# Patient Record
Sex: MALE | Race: WHITE | NOT HISPANIC OR LATINO | Employment: OTHER | URBAN - METROPOLITAN AREA
[De-identification: names, ages, dates, MRNs, and addresses within clinical notes are randomized per-mention and may not be internally consistent; named-entity substitution may affect disease eponyms.]

---

## 2017-02-27 RX ORDER — SIMVASTATIN 40 MG/1
TABLET, FILM COATED ORAL
Qty: 90 TABLET | Refills: 4 | Status: SHIPPED | OUTPATIENT
Start: 2017-02-27 | End: 2018-05-22 | Stop reason: SDUPTHER

## 2017-03-14 ENCOUNTER — LAB VISIT (OUTPATIENT)
Dept: LAB | Facility: HOSPITAL | Age: 72
End: 2017-03-14
Attending: UROLOGY
Payer: MEDICARE

## 2017-03-14 DIAGNOSIS — R97.20 ELEVATED PROSTATE SPECIFIC ANTIGEN (PSA): Primary | ICD-10-CM

## 2017-03-14 LAB — COMPLEXED PSA SERPL-MCNC: 0.48 NG/ML

## 2017-03-14 PROCEDURE — 36415 COLL VENOUS BLD VENIPUNCTURE: CPT | Mod: PO

## 2017-03-14 PROCEDURE — 84153 ASSAY OF PSA TOTAL: CPT

## 2017-03-20 DIAGNOSIS — F41.1 GENERALIZED ANXIETY DISORDER: ICD-10-CM

## 2017-03-20 RX ORDER — ALPRAZOLAM 1 MG/1
TABLET ORAL
Qty: 120 TABLET | Refills: 3 | Status: SHIPPED | OUTPATIENT
Start: 2017-03-20 | End: 2017-10-22 | Stop reason: SDUPTHER

## 2017-05-14 ENCOUNTER — HOSPITAL ENCOUNTER (OUTPATIENT)
Dept: RADIOLOGY | Facility: HOSPITAL | Age: 72
Discharge: HOME | End: 2017-05-14
Attending: NURSE PRACTITIONER
Payer: MEDICARE

## 2017-05-14 ENCOUNTER — OFFICE VISIT (OUTPATIENT)
Dept: URGENT CARE | Facility: CLINIC | Age: 72
End: 2017-05-14
Payer: MEDICARE

## 2017-05-14 VITALS
SYSTOLIC BLOOD PRESSURE: 138 MMHG | WEIGHT: 159.38 LBS | RESPIRATION RATE: 14 BRPM | DIASTOLIC BLOOD PRESSURE: 64 MMHG | BODY MASS INDEX: 22.82 KG/M2 | HEART RATE: 100 BPM | HEIGHT: 70 IN | OXYGEN SATURATION: 95 % | TEMPERATURE: 97 F

## 2017-05-14 DIAGNOSIS — K56.41 FECAL IMPACTION: Primary | ICD-10-CM

## 2017-05-14 DIAGNOSIS — K56.41 FECAL IMPACTION: ICD-10-CM

## 2017-05-14 DIAGNOSIS — K59.00 CONSTIPATION, UNSPECIFIED CONSTIPATION TYPE: ICD-10-CM

## 2017-05-14 PROCEDURE — 1125F AMNT PAIN NOTED PAIN PRSNT: CPT | Mod: S$GLB,,, | Performed by: NURSE PRACTITIONER

## 2017-05-14 PROCEDURE — 1157F ADVNC CARE PLAN IN RCRD: CPT | Mod: S$GLB,,, | Performed by: NURSE PRACTITIONER

## 2017-05-14 PROCEDURE — 1160F RVW MEDS BY RX/DR IN RCRD: CPT | Mod: S$GLB,,, | Performed by: NURSE PRACTITIONER

## 2017-05-14 PROCEDURE — 99999 PR PBB SHADOW E&M-EST. PATIENT-LVL V: CPT | Mod: PBBFAC,,, | Performed by: NURSE PRACTITIONER

## 2017-05-14 PROCEDURE — 3078F DIAST BP <80 MM HG: CPT | Mod: S$GLB,,, | Performed by: NURSE PRACTITIONER

## 2017-05-14 PROCEDURE — 74020 XR ABDOMEN FLAT AND ERECT: CPT | Mod: TC,PO

## 2017-05-14 PROCEDURE — 99499 UNLISTED E&M SERVICE: CPT | Mod: S$GLB,,, | Performed by: NURSE PRACTITIONER

## 2017-05-14 PROCEDURE — 74020 XR ABDOMEN FLAT AND ERECT: CPT | Mod: 26,,, | Performed by: RADIOLOGY

## 2017-05-14 PROCEDURE — 3075F SYST BP GE 130 - 139MM HG: CPT | Mod: S$GLB,,, | Performed by: NURSE PRACTITIONER

## 2017-05-14 PROCEDURE — 99213 OFFICE O/P EST LOW 20 MIN: CPT | Mod: S$GLB,,, | Performed by: NURSE PRACTITIONER

## 2017-05-14 PROCEDURE — 1159F MED LIST DOCD IN RCRD: CPT | Mod: S$GLB,,, | Performed by: NURSE PRACTITIONER

## 2017-05-14 NOTE — MR AVS SNAPSHOT
[facility]  [id number] [facility]  [place] [address]  Phone: [phone]  Fax: [phone]                  Tico Reeves   2017 9:30 AM   Office Visit    Description:  Male : 1945   Provider:  KATHRYN Leon   Department:  [facility]           Reason for Visit     Constipation           Diagnoses this Visit        Comments    Fecal impaction    -  Primary            To Do List           Goals (5 Years of Data)     None       These Medications        Disp Refills Start End    bisacodyl (FLEET) 10 mg/30 mL Enem 1 enema 0 2017    Place 30 mLs (10 mg total) rectally once. - Rectal    [facility] - [place] [address]  [address] [facility] #: [phone]         [facility] On [facility]     [facility] On Call Nurse Care Line -  Assistance  Unless otherwise directed by your provider, please contact [facility], our nurse care line that is available for  assistance.     Registered nurses in the [facility] provide: appointment scheduling, clinical advisement, health education, and other advisory services.  Call: [phone] (toll free)               Medications           Message regarding Medications     Verify the changes and/or additions to your medication regime listed below are the same as discussed with your clinician today.  If any of these changes or additions are incorrect, please notify your healthcare provider.        START taking these NEW medications        Refills    bisacodyl (FLEET) 10 mg/30 mL Enem 0    Sig: Place 30 mLs (10 mg total) rectally once.    Class: Print    Route: Rectal      STOP taking these medications     methylPREDNISolone (MEDROL, ARLENE,) 4 mg tablet use as directed           Verify that the below list of medications is an accurate representation of the medications you are currently taking.  If none reported, the list may be blank. If incorrect, please contact your  "healthcare provider. Carry this list with you in case of emergency.           Current Medications     alprazolam (XANAX) 1 MG tablet TAKE ONE TABLET BY MOUTH IN THE MORNING, 1 TABLET AT NOON, AND TWO TABLETS AT BEDTIME    aspirin 81 mg Tab Take 81 mg by mouth Daily. 1 Tablet Oral Every day    cetirizine (ZYRTEC) 10 MG tablet Take 10 mg by mouth once daily.    citalopram (CELEXA) 40 MG tablet TAKE ONE TABLET BY MOUTH ONCE DAILY    docusate sodium (COLACE) 100 MG capsule Take 1 capsule (100 mg total) by mouth 2 (two) times daily.    melatonin 10 mg Tab Take 1 tablet by mouth every evening. Pt takes 10mg and 5mg sometimes     midodrine (PROAMATINE) 10 MG tablet Take 1 tablet (10 mg total) by mouth 3 (three) times daily.    nitroGLYCERIN (NITROSTAT) 0.4 MG SL tablet Place 1 tablet (0.4 mg total) under the tongue every 5 (five) minutes as needed for Chest pain. 1 Tablet, Sublingual Sublingual As directed.  Take 1 tab sublingual prn chest pain.  If pain not gone in 5 minutes, please repeat.    omeprazole (PRILOSEC) 20 MG capsule TAKE ONE CAPSULE BY MOUTH IN THE MORNING    quetiapine (SEROQUEL) 25 MG Tab Take 2 tablets (50 mg total) by mouth every evening.    rivastigmine tartrate (EXELON) 3 MG capsule TAKE ONE CAPSULE BY MOUTH TWICE DAILY    simvastatin (ZOCOR) 40 MG tablet TAKE ONE TABLET BY MOUTH IN THE EVENING    baclofen (LIORESAL) 10 MG tablet Take 1 tablet (10 mg total) by mouth 3 (three) times daily.    bisacodyl (FLEET) 10 mg/30 mL Enem Place 30 mLs (10 mg total) rectally once.    tamsulosin (FLOMAX) 0.4 mg Cp24 Take 1 capsule (0.4 mg total) by mouth once daily.           Clinical Reference Information           Your Vitals Were     BP Pulse Temp Resp Height Weight    138/64 (BP Location: Left arm, Patient Position: Sitting) 100 97.2 °F (36.2 °C) 14 5' 10" (1.778 m) 72.3 kg (159 lb 6.3 oz)    SpO2 BMI             95% 22.87 kg/m2         Blood Pressure          Most Recent Value    BP  138/64      Allergies as of " 5/14/2017     Lipitor [Atorvastatin]      Immunizations Administered on Date of Encounter - 5/14/2017     None      Orders Placed During Today's Visit     Future Labs/Procedures Expected by Expires    X-Ray Abdomen Flat And Erect  5/14/2017 5/14/2018      Instructions        Treated Fecal Impaction  Fecal impaction is a severe form of constipation. It means you have a large amount of hard stool in your rectum that you can't pass. Although your impaction has been relieved, you may need to continue treatment at home. Your healthcare provider will tell you what to do. Follow the advice below to help avoid this problem in the future.  Fecal impaction can have many causes. Many of them are similar to the causes of constipation. They include:  · Diet low in fiber  · Too many dairy products and too much processed food  · Not drinking enough liquids  · Lack of exercise or physical activity  · Stress, depression  · Changes in daily routines  · Loss of body fluids because of vomiting or diarrhea  · Ignoring the urge to have a bowel movement or delaying until later  · Medicines like prescription pain medicines, especially narcotics, iron supplements, antacids, certain antidepressants, and calcium supplements  · An underlying illness  Home care  Take any medicines as directed. It is no longer thought that laxatives can cause damage to the intestines. However, some are better choices for occasional and long-term use. Talk with your healthcare provider or pharmacist if you have questions.  General care  · Prescription pain medicines can cause constipation. If your healthcare provider prescribes pain medicines, ask whether you should also take a stool softener.  · A diet high in fiber with plenty of fluids helps to maintain regular, soft bowel movements. These foods are good sources of dietary fiber:  ¨ Cereals and breads, such as whole-grain cereal with bran, oatmeal, rolled oats, and whole-grain breads  ¨ Fruits, both fresh and  dried, including raisins, prunes, apricots, berries, and figs  ¨ Fresh vegetables, especially peas, broccoli, brussels sprouts, winter squash, green beans, cauliflower, lima beans, and carrots  ¨ Popcorn and brown rice  · Drink plenty of water when you increase the amount of fiber you eat.  Follow-up care  Follow up with your healthcare provider if your symptoms don't get better in the next few days, or as advised. You may need more tests or to see a specialist.  Call 911  Call 911 if any of these occur:  · Stiff, rigid abdomen that is severely painful to touch, or when you move  · Trouble breathing  · Chest pain  · Confusion  · Fainting or loss of consciousness  · Rapid heart rate  When to seek medical advice  Call your healthcare provider right away if any of these occur:  · Fever over 100.4°F (38.0°C)  · You still don't have normal bowel movements  · Abdominal or back pain gets worse  · Vomiting  · Abdominal swelling  · Blood in the stool, or black, tarry school  · Weakness, dizziness, or fainting  · Unexpected vaginal bleeding  · Weight loss  Date Last Reviewed: 12/30/2015  © 2269-2738 [employer]. [address] [place] [address], [place] [address]. All rights reserved. This information is not intended as a substitute for professional medical care. Always follow your healthcare professional's instructions.        Treated Fecal Impaction  Fecal impaction is a severe form of constipation. It means you have a large amount of hard stool in your rectum that you can't pass. Although your impaction has been relieved, you may need to continue treatment at home. Your healthcare provider will tell you what to do. Follow the advice below to help avoid this problem in the future.  Fecal impaction can have many causes. Many of them are similar to the causes of constipation. They include:  · Diet low in fiber  · Too many dairy products and too much processed food  · Not drinking enough liquids  · Lack of exercise or  physical activity  · Stress, depression  · Changes in daily routines  · Loss of body fluids because of vomiting or diarrhea  · Ignoring the urge to have a bowel movement or delaying until later  · Medicines like prescription pain medicines, especially narcotics, iron supplements, antacids, certain antidepressants, and calcium supplements  · An underlying illness  Home care  Take any medicines as directed. It is no longer thought that laxatives can cause damage to the intestines. However, some are better choices for occasional and long-term use. Talk with your healthcare provider or pharmacist if you have questions.  General care  · Prescription pain medicines can cause constipation. If your healthcare provider prescribes pain medicines, ask whether you should also take a stool softener.  · A diet high in fiber with plenty of fluids helps to maintain regular, soft bowel movements. These foods are good sources of dietary fiber:  ¨ Cereals and breads, such as whole-grain cereal with bran, oatmeal, rolled oats, and whole-grain breads  ¨ Fruits, both fresh and dried, including raisins, prunes, apricots, berries, and figs  ¨ Fresh vegetables, especially peas, broccoli, brussels sprouts, winter squash, green beans, cauliflower, lima beans, and carrots  ¨ Popcorn and brown rice  · Drink plenty of water when you increase the amount of fiber you eat.  Follow-up care  Follow up with your healthcare provider if your symptoms don't get better in the next few days, or as advised. You may need more tests or to see a specialist.  Call 911  Call 911 if any of these occur:  · Stiff, rigid abdomen that is severely painful to touch, or when you move  · Trouble breathing  · Chest pain  · Confusion  · Fainting or loss of consciousness  · Rapid heart rate  When to seek medical advice  Call your healthcare provider right away if any of these occur:  · Fever over 100.4°F (38.0°C)  · You still don't have normal bowel movements  · Abdominal  or back pain gets worse  · Vomiting  · Abdominal swelling  · Blood in the stool, or black, tarry school  · Weakness, dizziness, or fainting  · Unexpected vaginal bleeding  · Weight loss  Date Last Reviewed: 12/30/2015 © 2000-2016 [employer]. [address] [place] [address], [place] [address]. All rights reserved. This information is not intended as a substitute for professional medical care. Always follow your healthcare professional's instructions.             Language Assistance Services     ATTENTION: Language assistance services are available, free of charge. Please call [phone].      ATENCIÓN: Si habla español, tiene a jewell disposición servicios gratuitos de asistencia lingüística. Llame al [phone].     CHÚ Ý: N?u b?n nói Ti?ng Vi?t, có các d?ch v? h? tr? ngôn ng? mi?n phí dành cho b?n. G?i s? [phone].         [facility] complies with applicable Federal civil rights laws and does not discriminate on the basis of race, color, national origin, age, disability, or sex.

## 2017-05-14 NOTE — PROGRESS NOTES
Subjective:       Patient ID: Tico Reeves is a 72 y.o. male.    Chief Complaint: Constipation (x five days)    HPI   Mr. Reeves presents to urgent care this AM with his wife. She explains that he has dementia and is somewhat obsessed with his bowels. He is complaining of rectal and abdominal pain. They are unclear when his last BM occurred. He is not a reliable historian. He does take a stool softener and Violetta lax daily.   Review of Systems   Constitutional: Negative for chills, diaphoresis and fever.   Respiratory: Negative for cough and shortness of breath.    Cardiovascular: Negative for leg swelling.   Gastrointestinal: Positive for abdominal pain and constipation. Negative for nausea and vomiting.   Skin: Negative for rash.   Psychiatric/Behavioral: Negative for behavioral problems and confusion.       Objective:      Physical Exam   Constitutional: He appears well-developed and well-nourished.   Eyes: Conjunctivae are normal.   Neck: Normal range of motion.   Cardiovascular: Normal rate and regular rhythm.    Pulmonary/Chest: Effort normal and breath sounds normal. No respiratory distress.   Abdominal: Soft. Bowel sounds are normal. He exhibits no distension. There is generalized tenderness. There is no rebound, no guarding, no tenderness at McBurney's point and negative Joel's sign.   Rectal exam reveals a hard stool ball which I was able to remove. Afterwards he was able to pass a large quantity of brown stool.   Musculoskeletal: Normal range of motion.   Neurological: He is alert.   Skin: Skin is warm and dry.   Psychiatric: He has a normal mood and affect. His behavior is normal.   Vitals reviewed.      Assessment:       1. Fecal impaction    2. Constipation, unspecified constipation type        Plan:   Tico was seen today for constipation.    Diagnoses and all orders for this visit:    Fecal impaction  -     X-Ray Abdomen Flat And Erect; Future    Constipation, unspecified constipation  type    Other orders  -     bisacodyl (FLEET) 10 mg/30 mL Enem; Place 30 mLs (10 mg total) rectally once.    I still see some fecal material present on x-ray. They will use a fleets enema at home. I will call with x-ray results. May need stimulant laxative.    Keep a record of when he has a bowel movement. If greater than three days use a fleets enema or laxative.    High fiber diet  Continue colace/ Miralax  Drink at least 64 ounces of water daily

## 2017-05-14 NOTE — PATIENT INSTRUCTIONS
Treated Fecal Impaction  Fecal impaction is a severe form of constipation. It means you have a large amount of hard stool in your rectum that you can't pass. Although your impaction has been relieved, you may need to continue treatment at home. Your healthcare provider will tell you what to do. Follow the advice below to help avoid this problem in the future.  Fecal impaction can have many causes. Many of them are similar to the causes of constipation. They include:  · Diet low in fiber  · Too many dairy products and too much processed food  · Not drinking enough liquids  · Lack of exercise or physical activity  · Stress, depression  · Changes in daily routines  · Loss of body fluids because of vomiting or diarrhea  · Ignoring the urge to have a bowel movement or delaying until later  · Medicines like prescription pain medicines, especially narcotics, iron supplements, antacids, certain antidepressants, and calcium supplements  · An underlying illness  Home care  Take any medicines as directed. It is no longer thought that laxatives can cause damage to the intestines. However, some are better choices for occasional and long-term use. Talk with your healthcare provider or pharmacist if you have questions.  General care  · Prescription pain medicines can cause constipation. If your healthcare provider prescribes pain medicines, ask whether you should also take a stool softener.  · A diet high in fiber with plenty of fluids helps to maintain regular, soft bowel movements. These foods are good sources of dietary fiber:  ¨ Cereals and breads, such as whole-grain cereal with bran, oatmeal, rolled oats, and whole-grain breads  ¨ Fruits, both fresh and dried, including raisins, prunes, apricots, berries, and figs  ¨ Fresh vegetables, especially peas, broccoli, brussels sprouts, winter squash, green beans, cauliflower, lima beans, and carrots  ¨ Popcorn and brown rice  · Drink plenty of water when you increase the amount  of fiber you eat.  Follow-up care  Follow up with your healthcare provider if your symptoms don't get better in the next few days, or as advised. You may need more tests or to see a specialist.  Call 911  Call 911 if any of these occur:  · Stiff, rigid abdomen that is severely painful to touch, or when you move  · Trouble breathing  · Chest pain  · Confusion  · Fainting or loss of consciousness  · Rapid heart rate  When to seek medical advice  Call your healthcare provider right away if any of these occur:  · Fever over 100.4°F (38.0°C)  · You still don't have normal bowel movements  · Abdominal or back pain gets worse  · Vomiting  · Abdominal swelling  · Blood in the stool, or black, tarry school  · Weakness, dizziness, or fainting  · Unexpected vaginal bleeding  · Weight loss  Date Last Reviewed: 12/30/2015 © 2000-2016 [employer]. [address] [place] [address], [place] [address]. All rights reserved. This information is not intended as a substitute for professional medical care. Always follow your healthcare professional's instructions.        Treated Fecal Impaction  Fecal impaction is a severe form of constipation. It means you have a large amount of hard stool in your rectum that you can't pass. Although your impaction has been relieved, you may need to continue treatment at home. Your healthcare provider will tell you what to do. Follow the advice below to help avoid this problem in the future.  Fecal impaction can have many causes. Many of them are similar to the causes of constipation. They include:  · Diet low in fiber  · Too many dairy products and too much processed food  · Not drinking enough liquids  · Lack of exercise or physical activity  · Stress, depression  · Changes in daily routines  · Loss of body fluids because of vomiting or diarrhea  · Ignoring the urge to have a bowel movement or delaying until later  · Medicines like prescription pain medicines, especially narcotics, iron  supplements, antacids, certain antidepressants, and calcium supplements  · An underlying illness  Home care  Take any medicines as directed. It is no longer thought that laxatives can cause damage to the intestines. However, some are better choices for occasional and long-term use. Talk with your healthcare provider or pharmacist if you have questions.  General care  · Prescription pain medicines can cause constipation. If your healthcare provider prescribes pain medicines, ask whether you should also take a stool softener.  · A diet high in fiber with plenty of fluids helps to maintain regular, soft bowel movements. These foods are good sources of dietary fiber:  ¨ Cereals and breads, such as whole-grain cereal with bran, oatmeal, rolled oats, and whole-grain breads  ¨ Fruits, both fresh and dried, including raisins, prunes, apricots, berries, and figs  ¨ Fresh vegetables, especially peas, broccoli, brussels sprouts, winter squash, green beans, cauliflower, lima beans, and carrots  ¨ Popcorn and brown rice  · Drink plenty of water when you increase the amount of fiber you eat.  Follow-up care  Follow up with your healthcare provider if your symptoms don't get better in the next few days, or as advised. You may need more tests or to see a specialist.  Call 911  Call 911 if any of these occur:  · Stiff, rigid abdomen that is severely painful to touch, or when you move  · Trouble breathing  · Chest pain  · Confusion  · Fainting or loss of consciousness  · Rapid heart rate  When to seek medical advice  Call your healthcare provider right away if any of these occur:  · Fever over 100.4°F (38.0°C)  · You still don't have normal bowel movements  · Abdominal or back pain gets worse  · Vomiting  · Abdominal swelling  · Blood in the stool, or black, tarry school  · Weakness, dizziness, or fainting  · Unexpected vaginal bleeding  · Weight loss  Date Last Reviewed: 12/30/2015  © 6842-9788 [employer]. [address]  [place] [address]. All rights reserved. This information is not intended as a substitute for professional medical care. Always follow your healthcare professional's instructions.

## 2017-05-19 ENCOUNTER — OFFICE VISIT (OUTPATIENT)
Dept: NEUROLOGY | Facility: CLINIC | Age: 72
End: 2017-05-19
Payer: MEDICARE

## 2017-05-19 VITALS
SYSTOLIC BLOOD PRESSURE: 100 MMHG | HEIGHT: 70 IN | HEART RATE: 74 BPM | BODY MASS INDEX: 23.58 KG/M2 | WEIGHT: 164.69 LBS | DIASTOLIC BLOOD PRESSURE: 72 MMHG

## 2017-05-19 DIAGNOSIS — G31.84 MILD COGNITIVE IMPAIRMENT WITH MEMORY LOSS: ICD-10-CM

## 2017-05-19 DIAGNOSIS — F32.1 MODERATE SINGLE CURRENT EPISODE OF MAJOR DEPRESSIVE DISORDER: Primary | ICD-10-CM

## 2017-05-19 PROCEDURE — 3074F SYST BP LT 130 MM HG: CPT | Mod: S$GLB,,, | Performed by: PSYCHIATRY & NEUROLOGY

## 2017-05-19 PROCEDURE — 1160F RVW MEDS BY RX/DR IN RCRD: CPT | Mod: S$GLB,,, | Performed by: PSYCHIATRY & NEUROLOGY

## 2017-05-19 PROCEDURE — 1126F AMNT PAIN NOTED NONE PRSNT: CPT | Mod: S$GLB,,, | Performed by: PSYCHIATRY & NEUROLOGY

## 2017-05-19 PROCEDURE — 99214 OFFICE O/P EST MOD 30 MIN: CPT | Mod: S$GLB,,, | Performed by: PSYCHIATRY & NEUROLOGY

## 2017-05-19 PROCEDURE — 1159F MED LIST DOCD IN RCRD: CPT | Mod: S$GLB,,, | Performed by: PSYCHIATRY & NEUROLOGY

## 2017-05-19 PROCEDURE — 99499 UNLISTED E&M SERVICE: CPT | Mod: S$GLB,,, | Performed by: PSYCHIATRY & NEUROLOGY

## 2017-05-19 PROCEDURE — 1157F ADVNC CARE PLAN IN RCRD: CPT | Mod: S$GLB,,, | Performed by: PSYCHIATRY & NEUROLOGY

## 2017-05-19 PROCEDURE — 3078F DIAST BP <80 MM HG: CPT | Mod: S$GLB,,, | Performed by: PSYCHIATRY & NEUROLOGY

## 2017-05-19 PROCEDURE — 99999 PR PBB SHADOW E&M-EST. PATIENT-LVL III: CPT | Mod: PBBFAC,,, | Performed by: PSYCHIATRY & NEUROLOGY

## 2017-05-19 RX ORDER — RIVASTIGMINE TARTRATE 6 MG/1
6 CAPSULE ORAL 2 TIMES DAILY
Qty: 60 CAPSULE | Refills: 11 | Status: SHIPPED | OUTPATIENT
Start: 2017-05-19 | End: 2018-05-22 | Stop reason: SDUPTHER

## 2017-05-19 RX ORDER — QUETIAPINE FUMARATE 25 MG/1
TABLET, FILM COATED ORAL
Qty: 270 TABLET | Refills: 3 | Status: SHIPPED | OUTPATIENT
Start: 2017-05-19 | End: 2018-09-04 | Stop reason: SDUPTHER

## 2017-05-19 NOTE — MR AVS SNAPSHOT
[facility]  [address] [place] [id number]  Phone: [phone]  Fax: [phone]                  Tico Reeves   2017 9:00 AM   Office Visit    Description:  Male : 1945   Provider:  Edis Gasca MD   Department:  [facility]           Reason for Visit     Neurologic Problem           Diagnoses this Visit        Comments    Moderate single current episode of major depressive disorder    -  Primary     Mild cognitive impairment with memory loss                To Do List           Goals (5 Years of Data)     None       These Medications        Disp Refills Start End    quetiapine (SEROQUEL) 25 MG Tab 270 tablet 3 2017     Take 1 tab in the AM and 2 in the PM    Pharmacy: [facility] [address] [place] [address] Ph #: [phone]       rivastigmine tartrate (EXELON) 6 mg capsule 60 capsule 11 2017     Take 1 capsule (6 mg total) by mouth 2 (two) times daily. - Oral    Pharmacy: [facility] [address] [place] [address] Ph #: [phone]         [facility]     [facility] Nurse Care Line -  Assistance  Unless otherwise directed by your provider, please contact [facility], our nurse care line that is available for  assistance.     Registered nurses in the [facility] provide: appointment scheduling, clinical advisement, health education, and other advisory services.  Call: [phone] (toll free)               Medications           Message regarding Medications     Verify the changes and/or additions to your medication regime listed below are the same as discussed with your clinician today.  If any of these changes or additions are incorrect, please notify your healthcare provider.        CHANGE how you are taking these medications     Start Taking Instead of    quetiapine (SEROQUEL) 25 MG Tab quetiapine (SEROQUEL) 25 MG Tab    Dosage:  Take 1 tab in the  AM and 2 in the PM Dosage:  Take 2 tablets (50 mg total) by mouth every evening.    Reason for Change:  Reorder     rivastigmine tartrate (EXELON) 6 mg capsule rivastigmine tartrate (EXELON) 3 MG capsule    Dosage:  Take 1 capsule (6 mg total) by mouth 2 (two) times daily. Dosage:  TAKE ONE CAPSULE BY MOUTH TWICE DAILY    Reason for Change:  Reorder            Verify that the below list of medications is an accurate representation of the medications you are currently taking.  If none reported, the list may be blank. If incorrect, please contact your healthcare provider. Carry this list with you in case of emergency.           Current Medications     alprazolam (XANAX) 1 MG tablet TAKE ONE TABLET BY MOUTH IN THE MORNING, 1 TABLET AT NOON, AND TWO TABLETS AT BEDTIME    aspirin 81 mg Tab Take 81 mg by mouth Daily. 1 Tablet Oral Every day    baclofen (LIORESAL) 10 MG tablet Take 1 tablet (10 mg total) by mouth 3 (three) times daily.    cetirizine (ZYRTEC) 10 MG tablet Take 10 mg by mouth once daily.    citalopram (CELEXA) 40 MG tablet TAKE ONE TABLET BY MOUTH ONCE DAILY    docusate sodium (COLACE) 100 MG capsule Take 1 capsule (100 mg total) by mouth 2 (two) times daily.    melatonin 10 mg Tab Take 1 tablet by mouth every evening. Pt takes 10mg and 5mg sometimes     midodrine (PROAMATINE) 10 MG tablet Take 1 tablet (10 mg total) by mouth 3 (three) times daily.    nitroGLYCERIN (NITROSTAT) 0.4 MG SL tablet Place 1 tablet (0.4 mg total) under the tongue every 5 (five) minutes as needed for Chest pain. 1 Tablet, Sublingual Sublingual As directed.  Take 1 tab sublingual prn chest pain.  If pain not gone in 5 minutes, please repeat.    omeprazole (PRILOSEC) 20 MG capsule TAKE ONE CAPSULE BY MOUTH IN THE MORNING    quetiapine (SEROQUEL) 25 MG Tab Take 1 tab in the AM and 2 in the PM    rivastigmine tartrate (EXELON) 6 mg capsule Take 1 capsule (6 mg total) by mouth 2 (two) times daily.    simvastatin (ZOCOR) 40 MG tablet TAKE  "ONE TABLET BY MOUTH IN THE EVENING    tamsulosin (FLOMAX) 0.4 mg Cp24 Take 1 capsule (0.4 mg total) by mouth once daily.           Clinical Reference Information           Your Vitals Were     BP Pulse Height Weight BMI    100/72 74 5' 10" (1.778 m) 74.7 kg (164 lb 10.9 oz) 23.63 kg/m2      Blood Pressure          Most Recent Value    BP  100/72      Allergies as of 5/19/2017     Lipitor [Atorvastatin]      Immunizations Administered on Date of Encounter - 5/19/2017     None      Language Assistance Services     ATTENTION: Language assistance services are available, free of charge. Please call [phone].      ATENCIÓN: Si habla seraañol, tiene a jewell disposición servicios gratuitos de asistencia lingüística. Llame al [phone].     CHÚ Ý: N?u b?n nói Ti?ng Vi?t, có các d?ch v? h? tr? ngôn ng? mi?n phí dành cho b?n. G?i s? [phone].         [facility] complies with applicable Federal civil rights laws and does not discriminate on the basis of race, color, national origin, age, disability, or sex.        "

## 2017-05-19 NOTE — PROGRESS NOTES
This is a 72-year-old right-handed patient who is followed by this office for his mild cognitive impairment.  The patient however is accounted by his wife today and she indicates that his anxiety level has seem to worsen.  In addition, he is now having a tendency to focus almost completely on urination and bowel movements.  The patient has been actively investigated by urology and no significant abnormality has been identified through cystometrogram and other diagnostic studies.  However, the patient continues to complain of burning in the lower abdomen and in the penile area.  The patient states that he has difficulty urinating at times and has to drink a lot of water to be able to void.  The patient's wife indicates that he will then focus on urination throughout the day as well as focus on having a bowel movement.    The patient has periods of extreme anxiety becoming extremely restless almost agitated.  Based upon his blood pressure, the patient's wife will give him a small dose of Xanax which will help calm the severe anxiety momentarily.  However, the Xanax does seem to lower his blood pressure excessively.    The patient is occasionally awakening at night to urinate.  He does not seem to have evidence of sundowning.  He denies any his wife denies any active visual or auditory hallucinations.  He continues to have a resting and intention tremor which is worse in the right than on the left.  He is able to ambulate however independently without difficulty.  He is not had any falls.  He is not had any seizure or unexplained loss of consciousness.      ROS:  GENERAL: No fever, chills, or night sweats.  SKIN: No rashes, itching or changes in color or texture of skin.  HEAD: No headaches or recent head trauma.  EYES: Visual acuity fine. No photophobia, ocular pain or diplopia.  EARS: Denies ear pain, discharge or vertigo.  NOSE: No loss of smell, no epistaxis or postnasal drip.  MOUTH & THROAT: No hoarseness or  change in voice. No excessive gum bleeding.  NODES: Denies swollen glands.  CHEST: Denies BERRIOS, cyanosis, wheezing, cough and sputum production.  CARDIOVASCULAR: Denies chest pain, PND, orthopnea or reduced exercise tolerance.  ABDOMEN: Appetite fine. No weight loss. Denies diarrhea, abdominal pain, hematemesis or blood in stool.  URINARY: See comments in present illness.  PERIPHERAL VASCULAR: No claudication or cyanosis.  MUSCULOSKELETAL: No joint stiffness or swelling. Denies back pain.  NEUROLOGIC: No history of seizures, paralysis, alteration of gait or coordination.    The patient's past history, family history, and social history were reviewed with the patient, his medical records, and his wife.    PE:   VITAL SIGNS: Blood pressure 100/72, pulse 74, weight 74.7 kg, height 5 foot 10 inches, BMI 23.63  APPEARANCE: Well nourished, well developed, extremely anxious and restless  HEAD: Normocephalic, atraumatic.  EYES: PERRL. EOMI.  Non-icteric sclerae.    EARS: TM's intact. Light reflex normal. No retraction or perforation.    NOSE: Mucosa pink. Airway clear.  MOUTH & THROAT: No tonsillar enlargement. No pharyngeal erythema or exudate. No stridor.  NECK: Supple. No bruits.  CHEST: Lungs clear to auscultation.  CARDIOVASCULAR: Regular rhythm without significant murmurs.  ABDOMEN: Bowel sounds normal. Not distended.   MUSCULOSKELETAL:  No bony deformity seen.  Muscle tone is normal.  No cogwheel rigidity is found with passive range of motion at either wrist or either elbow.  Muscle mass is normal both upper and both lower extremities as well.  NEUROLOGIC:   Mental Status:  The patient is well oriented to person, and place.  However he could only name the month but not the day, date, or year.  He is able to follow one-step commands without difficulty.  His speech however is focused on burning in the lower abdomen and penile area.  The patient is attentive to the environment and cooperative for the exam.  Cranial  Nerves: II-XII grossly intact. Fundoscopic exam is normal.  No hemorrhage, exudate or papilledema is present. The extraocular muscles are intact in the cardinal directions of gaze.  No ptosis is present. Facial features are symmetrical.  Speech is normal in fluency, diction, and phrasing.  Tongue protrudes in the midline.    Gait and Station: The patient was able to come from sit to stand independently.  He ambulates independently with a slightly slow wide based gait without shuffling.  Motor:  No downdrift of either arm when held at shoulder level.  Manual muscle testing of proximal and distal muscles of both upper and lower extremities is normal.   Sensory:  Intact both upper and lower extremities to pin prick, touch, and vibration.  Cerebellar: The patient has a fairly active resting tremor in the right hand which is not seen with finger to nose.  Alternating movements are very slowly performed on the right compared to that on the left.  Reflexes:  Stretch reflexes are 1+ both upper and lower extremities.  Plantar stimulation is flexor bilaterally and no pathological reflexes are seen      ASSESSMENT:  1.  Mild cognitive impairment with memory loss in patient with tremor suggestive of Parkinson's    DISCUSSION:  The patient has tried and failed medications for tremor in the past and so these medications have been discontinued.  His primary problem on today's visit is that of his generalized anxiety disorder with a tendency to fixate on urinary habit and bowel habits.  The patient's wife indicates that the urologist have completed their workup and did not find any significant abnormality.  He definitely does not have a urinary tract infection.  However, the patient continues to worry constantly about his ability to urinate.    RECOMMENDATIONS:  1.  Increase Exelon to 6 mg twice a day  2.  Change Seroquel to 25 mg in the morning and 50 mg at bedtime  3.  We offered psychiatric referral for the patient but the wife  refused at this time.  She wishes to try the change in medication first.  4.  Return to neurology in 6 months.    This was a 30 minute visit with the patient and his wife with over 50% of time spent counseling the patient and his wife.    This note is generated with speech recognition software and is subject to transcription error and sound alike phrases that may be missed by proofreading.

## 2017-05-24 ENCOUNTER — PATIENT MESSAGE (OUTPATIENT)
Dept: NEUROLOGY | Facility: CLINIC | Age: 72
End: 2017-05-24

## 2017-06-26 RX ORDER — QUETIAPINE FUMARATE 25 MG/1
TABLET, FILM COATED ORAL
Qty: 180 TABLET | Refills: 3 | Status: SHIPPED | OUTPATIENT
Start: 2017-06-26 | End: 2017-12-11 | Stop reason: SDUPTHER

## 2017-06-26 RX ORDER — OMEPRAZOLE 20 MG/1
CAPSULE, DELAYED RELEASE ORAL
Qty: 90 CAPSULE | Refills: 4 | Status: SHIPPED | OUTPATIENT
Start: 2017-06-26 | End: 2018-08-14 | Stop reason: SDUPTHER

## 2017-07-17 RX ORDER — MIDODRINE HYDROCHLORIDE 10 MG/1
10 TABLET ORAL 2 TIMES DAILY
Qty: 60 TABLET | Refills: 0 | Status: SHIPPED | OUTPATIENT
Start: 2017-07-17 | End: 2017-08-21 | Stop reason: SDUPTHER

## 2017-08-21 RX ORDER — MIDODRINE HYDROCHLORIDE 10 MG/1
10 TABLET ORAL 2 TIMES DAILY
Qty: 60 TABLET | Refills: 0 | Status: SHIPPED | OUTPATIENT
Start: 2017-08-21 | End: 2017-10-20 | Stop reason: SDUPTHER

## 2017-10-20 RX ORDER — MIDODRINE HYDROCHLORIDE 10 MG/1
TABLET ORAL
Qty: 60 TABLET | Refills: 0 | Status: SHIPPED | OUTPATIENT
Start: 2017-10-20 | End: 2017-12-08 | Stop reason: SDUPTHER

## 2017-10-22 DIAGNOSIS — F41.1 GENERALIZED ANXIETY DISORDER: ICD-10-CM

## 2017-10-23 RX ORDER — ALPRAZOLAM 1 MG/1
TABLET ORAL
Qty: 120 TABLET | Refills: 3 | Status: SHIPPED | OUTPATIENT
Start: 2017-10-23 | End: 2018-05-01 | Stop reason: SDUPTHER

## 2017-11-08 ENCOUNTER — TELEPHONE (OUTPATIENT)
Dept: INTERNAL MEDICINE | Facility: CLINIC | Age: 72
End: 2017-11-08

## 2017-11-08 DIAGNOSIS — F32.1 MODERATE SINGLE CURRENT EPISODE OF MAJOR DEPRESSIVE DISORDER: Primary | ICD-10-CM

## 2017-11-08 RX ORDER — BUPROPION HYDROCHLORIDE 150 MG/1
150 TABLET ORAL DAILY
Qty: 30 TABLET | Refills: 11 | Status: SHIPPED | OUTPATIENT
Start: 2017-11-08 | End: 2018-11-01 | Stop reason: SDUPTHER

## 2017-11-08 NOTE — TELEPHONE ENCOUNTER
He is maximized on celexa.  Will add wellbutrin xl 150 mg once daily.  Referral placed to psychiatry for follow up.

## 2017-11-08 NOTE — TELEPHONE ENCOUNTER
Wife stated that patient has hit a road in depression where he is continuously crying often.  Wife stated he is on the celexa 40mg and a xanax as back up but can't ever take the xanax due to his blood pressure being too low when it comes time for him to need one.  Wife wants to know what can we change or increase to help with patients depression.  Please advise.

## 2017-12-08 RX ORDER — MIDODRINE HYDROCHLORIDE 10 MG/1
TABLET ORAL
Qty: 60 TABLET | Refills: 0 | Status: SHIPPED | OUTPATIENT
Start: 2017-12-08 | End: 2018-02-01 | Stop reason: SDUPTHER

## 2017-12-11 ENCOUNTER — OFFICE VISIT (OUTPATIENT)
Dept: INTERNAL MEDICINE | Facility: CLINIC | Age: 72
End: 2017-12-11
Payer: MEDICARE

## 2017-12-11 ENCOUNTER — LAB VISIT (OUTPATIENT)
Dept: LAB | Facility: HOSPITAL | Age: 72
End: 2017-12-11
Attending: INTERNAL MEDICINE
Payer: MEDICARE

## 2017-12-11 VITALS
WEIGHT: 151.69 LBS | SYSTOLIC BLOOD PRESSURE: 112 MMHG | DIASTOLIC BLOOD PRESSURE: 70 MMHG | HEIGHT: 70 IN | HEART RATE: 74 BPM | BODY MASS INDEX: 21.72 KG/M2 | TEMPERATURE: 98 F

## 2017-12-11 DIAGNOSIS — F41.9 ANXIETY: ICD-10-CM

## 2017-12-11 DIAGNOSIS — E78.2 MIXED HYPERLIPIDEMIA: ICD-10-CM

## 2017-12-11 DIAGNOSIS — I10 ESSENTIAL HYPERTENSION: ICD-10-CM

## 2017-12-11 DIAGNOSIS — Z12.5 ENCOUNTER FOR SCREENING FOR MALIGNANT NEOPLASM OF PROSTATE: ICD-10-CM

## 2017-12-11 DIAGNOSIS — I25.10 CAD, MULTIPLE VESSEL: ICD-10-CM

## 2017-12-11 DIAGNOSIS — F32.1 MODERATE SINGLE CURRENT EPISODE OF MAJOR DEPRESSIVE DISORDER: ICD-10-CM

## 2017-12-11 DIAGNOSIS — Z12.11 COLON CANCER SCREENING: ICD-10-CM

## 2017-12-11 DIAGNOSIS — K59.01 SLOW TRANSIT CONSTIPATION: ICD-10-CM

## 2017-12-11 DIAGNOSIS — Z00.00 ROUTINE GENERAL MEDICAL EXAMINATION AT HEALTH CARE FACILITY: Primary | ICD-10-CM

## 2017-12-11 DIAGNOSIS — I70.0 ATHEROSCLEROSIS OF AORTA: ICD-10-CM

## 2017-12-11 LAB
ALBUMIN SERPL BCP-MCNC: 3.8 G/DL
ALP SERPL-CCNC: 96 U/L
ALT SERPL W/O P-5'-P-CCNC: 9 U/L
ANION GAP SERPL CALC-SCNC: 7 MMOL/L
AST SERPL-CCNC: 18 U/L
BASOPHILS # BLD AUTO: 0.06 K/UL
BASOPHILS NFR BLD: 1 %
BILIRUB SERPL-MCNC: 0.8 MG/DL
BUN SERPL-MCNC: 16 MG/DL
CALCIUM SERPL-MCNC: 9.9 MG/DL
CHLORIDE SERPL-SCNC: 106 MMOL/L
CHOLEST SERPL-MCNC: 156 MG/DL
CHOLEST/HDLC SERPL: 3.5 {RATIO}
CO2 SERPL-SCNC: 29 MMOL/L
COMPLEXED PSA SERPL-MCNC: 0.75 NG/ML
CREAT SERPL-MCNC: 1 MG/DL
DIFFERENTIAL METHOD: ABNORMAL
EOSINOPHIL # BLD AUTO: 0.2 K/UL
EOSINOPHIL NFR BLD: 2.4 %
ERYTHROCYTE [DISTWIDTH] IN BLOOD BY AUTOMATED COUNT: 12.2 %
EST. GFR  (AFRICAN AMERICAN): >60 ML/MIN/1.73 M^2
EST. GFR  (NON AFRICAN AMERICAN): >60 ML/MIN/1.73 M^2
GLUCOSE SERPL-MCNC: 112 MG/DL
HCT VFR BLD AUTO: 43.6 %
HDLC SERPL-MCNC: 45 MG/DL
HDLC SERPL: 28.8 %
HGB BLD-MCNC: 14.8 G/DL
IMM GRANULOCYTES # BLD AUTO: 0.01 K/UL
IMM GRANULOCYTES NFR BLD AUTO: 0.2 %
LDLC SERPL CALC-MCNC: 93.4 MG/DL
LYMPHOCYTES # BLD AUTO: 1.7 K/UL
LYMPHOCYTES NFR BLD: 27.7 %
MCH RBC QN AUTO: 32.5 PG
MCHC RBC AUTO-ENTMCNC: 33.9 G/DL
MCV RBC AUTO: 96 FL
MONOCYTES # BLD AUTO: 0.5 K/UL
MONOCYTES NFR BLD: 7.9 %
NEUTROPHILS # BLD AUTO: 3.8 K/UL
NEUTROPHILS NFR BLD: 60.8 %
NONHDLC SERPL-MCNC: 111 MG/DL
NRBC BLD-RTO: 0 /100 WBC
PLATELET # BLD AUTO: 233 K/UL
PMV BLD AUTO: 10 FL
POTASSIUM SERPL-SCNC: 5.4 MMOL/L
PROT SERPL-MCNC: 7.3 G/DL
RBC # BLD AUTO: 4.55 M/UL
SODIUM SERPL-SCNC: 142 MMOL/L
TRIGL SERPL-MCNC: 88 MG/DL
WBC # BLD AUTO: 6.17 K/UL

## 2017-12-11 PROCEDURE — 36415 COLL VENOUS BLD VENIPUNCTURE: CPT | Mod: PO

## 2017-12-11 PROCEDURE — 99999 PR PBB SHADOW E&M-EST. PATIENT-LVL III: CPT | Mod: PBBFAC,,, | Performed by: INTERNAL MEDICINE

## 2017-12-11 PROCEDURE — 99397 PER PM REEVAL EST PAT 65+ YR: CPT | Mod: 25,S$GLB,, | Performed by: INTERNAL MEDICINE

## 2017-12-11 PROCEDURE — 80053 COMPREHEN METABOLIC PANEL: CPT

## 2017-12-11 PROCEDURE — 99499 UNLISTED E&M SERVICE: CPT | Mod: S$GLB,,, | Performed by: INTERNAL MEDICINE

## 2017-12-11 PROCEDURE — 90662 IIV NO PRSV INCREASED AG IM: CPT | Mod: S$GLB,,, | Performed by: INTERNAL MEDICINE

## 2017-12-11 PROCEDURE — 80061 LIPID PANEL: CPT

## 2017-12-11 PROCEDURE — 84153 ASSAY OF PSA TOTAL: CPT

## 2017-12-11 PROCEDURE — 85025 COMPLETE CBC W/AUTO DIFF WBC: CPT

## 2017-12-11 PROCEDURE — G0008 ADMIN INFLUENZA VIRUS VAC: HCPCS | Mod: S$GLB,,, | Performed by: INTERNAL MEDICINE

## 2017-12-11 RX ORDER — POLYETHYLENE GLYCOL 3350 17 G/17G
17 POWDER, FOR SOLUTION ORAL DAILY
Qty: 1530 G | Refills: 11 | Status: SHIPPED | OUTPATIENT
Start: 2017-12-11

## 2017-12-11 NOTE — PROGRESS NOTES
Subjective:       Patient ID: Tico Reeves is a 72 y.o. male.    Chief Complaint: Annual Exam    HPI  Patient is a sitting 2-year-old male presented for updated physical exam review chronic health issues.  Is patient has history of depression and anxiety, CAD, hyperlipidemia, hypertension, atherosclerotic disease and some issues with constipation.  Overall he indicates he's been doing pretty well.  Recently stable on his current regimens at this time.  He continues to struggle with not having a lot of motivation to do things with his anxiety and depression.  He is suffering now with some issues around constipation.  He's lost some weight recently because he is not eating because of constipation.  Several of his medications certainly are contributing to his constipation issue and as a result it he is sort of cut back on his by mouth intake.  We discussed his regimen right now he takes a stool softener daily and uses a laxative about twice a week.  Even with this it doesn't seem to be giving him the bowel movements that he would like.  He is relatively sedentary.  He does not drink a lot of water.    Review of Systems   Constitutional: Negative for fever and unexpected weight change.   HENT: Negative for hearing loss, postnasal drip and rhinorrhea.    Eyes: Negative for pain and visual disturbance.   Respiratory: Negative for cough, shortness of breath and wheezing.    Cardiovascular: Negative for chest pain and palpitations.   Gastrointestinal: Positive for constipation. Negative for diarrhea, nausea and vomiting.   Genitourinary: Negative for dysuria and hematuria.   Musculoskeletal: Negative for arthralgias, back pain, myalgias and neck stiffness.   Skin: Negative for pallor and rash.   Neurological: Negative for seizures, syncope and headaches.   Hematological: Negative for adenopathy.   Psychiatric/Behavioral: Negative for dysphoric mood. The patient is nervous/anxious.        Objective:   /70   Pulse  "74   Temp 97.6 °F (36.4 °C)   Ht 5' 10" (1.778 m)   Wt 68.8 kg (151 lb 10.8 oz)   BMI 21.76 kg/m²      Physical Exam   Constitutional: He is oriented to person, place, and time. He appears well-developed and well-nourished. No distress.   HENT:   Head: Normocephalic and atraumatic.   Mouth/Throat: Oropharynx is clear and moist.   Eyes: EOM are normal. Pupils are equal, round, and reactive to light.   Neck: Normal range of motion. Neck supple. No JVD present. No thyromegaly present.   Cardiovascular: Normal rate, regular rhythm, normal heart sounds and intact distal pulses.  Exam reveals no gallop and no friction rub.    No murmur heard.  Pulmonary/Chest: Effort normal and breath sounds normal. He has no wheezes. He has no rales.   Abdominal: Soft. Bowel sounds are normal. He exhibits no distension. There is no tenderness. There is no rebound and no guarding.   Musculoskeletal: Normal range of motion. He exhibits no edema.   Lymphadenopathy:     He has no cervical adenopathy.   Neurological: He is alert and oriented to person, place, and time. He has normal reflexes. No cranial nerve deficit.   Skin: Skin is warm and dry. No rash noted.   Psychiatric: He has a normal mood and affect. Judgment normal.   Vitals reviewed.          Assessment:       1. Routine general medical examination at health care facility    2. Moderate single current episode of major depressive disorder    3. Anxiety    4. CAD, multiple vessel    5. Mixed hyperlipidemia    6. Essential hypertension    7. Atherosclerosis of aorta    8. Colon cancer screening    9. Slow transit constipation    10. Encounter for screening for malignant neoplasm of prostate        Plan:   Major depressive disorder, single episode  Continue meds, stable over time.    Hypertension  Blood pressure is under good control.  We will continue the current regimen.  Will work on regular aerobic exercise and a low salt diet.        Hyperlipidemia  Update labs.  Continue " simvastatin    CAD, multiple vessel  No chest pain.  Stable over time.  Following with Cardiology    Anxiety  Continue current meds.  No changes today    Tico was seen today for annual exam.    Diagnoses and all orders for this visit:    Routine general medical examination at health care facility  Comments:  Focus on good health habits, low fat diet, regular exercise, seatbelt use.    Moderate single current episode of major depressive disorder    Anxiety    CAD, multiple vessel  -     Lipid panel; Future  -     Comprehensive metabolic panel; Future  -     CBC auto differential; Future    Mixed hyperlipidemia    Essential hypertension    Atherosclerosis of aorta    Colon cancer screening  -     Case request GI: COLONOSCOPY    Slow transit constipation    Encounter for screening for malignant neoplasm of prostate  -     PSA, Screening; Future    Other orders  -     polyethylene glycol (GLYCOLAX) 17 gram/dose powder; Take 17 g by mouth once daily.  -     Influenza - High Dose (65+) (PF) (IM)     we discussed some options and will coordinate his go ahead and start him back on MiraLAX every morning.  Indicated they can increase this to twice a day if they need to.  I stressed the importance of drinking water.  He needs to try to get at least 60 ounces of water a day.  I also expressed that he needs to get up and move about.  I talked to them about the importance of moving to help the bowels functioned properly and expressed understanding.  We will update his lab work.  He is due for colonoscopy so that is been ordered.  If they do not see significant improvement in his bowels with this new regimen for follow-up.    Return in about 6 months (around 6/11/2018).

## 2017-12-12 ENCOUNTER — PATIENT MESSAGE (OUTPATIENT)
Dept: INTERNAL MEDICINE | Facility: CLINIC | Age: 72
End: 2017-12-12

## 2017-12-12 ENCOUNTER — LAB VISIT (OUTPATIENT)
Dept: LAB | Facility: HOSPITAL | Age: 72
End: 2017-12-12
Attending: INTERNAL MEDICINE
Payer: MEDICARE

## 2017-12-12 DIAGNOSIS — E87.5 HYPERKALEMIA: ICD-10-CM

## 2017-12-12 DIAGNOSIS — R73.9 HYPERGLYCEMIA: ICD-10-CM

## 2017-12-12 DIAGNOSIS — E87.5 HYPERKALEMIA: Primary | ICD-10-CM

## 2017-12-12 LAB
ESTIMATED AVG GLUCOSE: 108 MG/DL
HBA1C MFR BLD HPLC: 5.4 %
POTASSIUM SERPL-SCNC: 4.5 MMOL/L

## 2017-12-12 PROCEDURE — 84132 ASSAY OF SERUM POTASSIUM: CPT

## 2017-12-12 PROCEDURE — 36415 COLL VENOUS BLD VENIPUNCTURE: CPT | Mod: PO

## 2017-12-12 PROCEDURE — 83036 HEMOGLOBIN GLYCOSYLATED A1C: CPT

## 2017-12-28 RX ORDER — NITROGLYCERIN 0.4 MG/1
0.4 TABLET SUBLINGUAL EVERY 5 MIN PRN
Qty: 30 TABLET | Refills: 11 | Status: SHIPPED | OUTPATIENT
Start: 2017-12-28 | End: 2020-03-23 | Stop reason: SDUPTHER

## 2018-01-08 ENCOUNTER — PES CALL (OUTPATIENT)
Dept: ADMINISTRATIVE | Facility: CLINIC | Age: 73
End: 2018-01-08

## 2018-01-15 ENCOUNTER — LAB VISIT (OUTPATIENT)
Dept: LAB | Facility: HOSPITAL | Age: 73
End: 2018-01-15
Attending: INTERNAL MEDICINE
Payer: MEDICARE

## 2018-01-15 ENCOUNTER — TELEPHONE (OUTPATIENT)
Dept: INTERNAL MEDICINE | Facility: CLINIC | Age: 73
End: 2018-01-15

## 2018-01-15 DIAGNOSIS — R30.0 DYSURIA: ICD-10-CM

## 2018-01-15 DIAGNOSIS — R30.0 DYSURIA: Primary | ICD-10-CM

## 2018-01-15 LAB

## 2018-01-15 PROCEDURE — 81003 URINALYSIS AUTO W/O SCOPE: CPT

## 2018-01-15 NOTE — TELEPHONE ENCOUNTER
Wife dropped off urine on patient, patient is fixated on a possible UTI. Burning.  Can you please sign orders for a urinalysis.

## 2018-02-01 RX ORDER — MIDODRINE HYDROCHLORIDE 10 MG/1
TABLET ORAL
Qty: 60 TABLET | Refills: 6 | Status: SHIPPED | OUTPATIENT
Start: 2018-02-01 | End: 2018-06-18 | Stop reason: SDUPTHER

## 2018-02-21 RX ORDER — CITALOPRAM 40 MG/1
TABLET, FILM COATED ORAL
Qty: 90 TABLET | Refills: 4 | Status: SHIPPED | OUTPATIENT
Start: 2018-02-21 | End: 2018-11-19 | Stop reason: SDUPTHER

## 2018-03-07 ENCOUNTER — PES CALL (OUTPATIENT)
Dept: ADMINISTRATIVE | Facility: CLINIC | Age: 73
End: 2018-03-07

## 2018-05-01 DIAGNOSIS — F41.1 GENERALIZED ANXIETY DISORDER: ICD-10-CM

## 2018-05-01 RX ORDER — ALPRAZOLAM 1 MG/1
TABLET ORAL
Qty: 120 TABLET | Refills: 3 | Status: SHIPPED | OUTPATIENT
Start: 2018-05-01 | End: 2018-11-19 | Stop reason: SDUPTHER

## 2018-05-15 ENCOUNTER — TELEPHONE (OUTPATIENT)
Dept: GASTROENTEROLOGY | Facility: CLINIC | Age: 73
End: 2018-05-15

## 2018-05-15 ENCOUNTER — HOSPITAL ENCOUNTER (OUTPATIENT)
Dept: RADIOLOGY | Facility: HOSPITAL | Age: 73
Discharge: HOME | End: 2018-05-15
Attending: NURSE PRACTITIONER
Payer: MEDICARE

## 2018-05-15 ENCOUNTER — OFFICE VISIT (OUTPATIENT)
Dept: GASTROENTEROLOGY | Facility: CLINIC | Age: 73
End: 2018-05-15
Payer: MEDICARE

## 2018-05-15 VITALS
BODY MASS INDEX: 19.76 KG/M2 | HEIGHT: 70 IN | HEART RATE: 74 BPM | DIASTOLIC BLOOD PRESSURE: 86 MMHG | SYSTOLIC BLOOD PRESSURE: 132 MMHG | WEIGHT: 138 LBS

## 2018-05-15 DIAGNOSIS — K59.04 CHRONIC IDIOPATHIC CONSTIPATION: ICD-10-CM

## 2018-05-15 DIAGNOSIS — R10.84 GENERALIZED ABDOMINAL PAIN: ICD-10-CM

## 2018-05-15 DIAGNOSIS — K59.04 CHRONIC IDIOPATHIC CONSTIPATION: Primary | ICD-10-CM

## 2018-05-15 DIAGNOSIS — R63.4 WEIGHT LOSS: ICD-10-CM

## 2018-05-15 PROCEDURE — 74019 RADEX ABDOMEN 2 VIEWS: CPT | Mod: TC,FY,PO

## 2018-05-15 PROCEDURE — 99204 OFFICE O/P NEW MOD 45 MIN: CPT | Mod: S$GLB,,, | Performed by: NURSE PRACTITIONER

## 2018-05-15 PROCEDURE — 3075F SYST BP GE 130 - 139MM HG: CPT | Mod: CPTII,S$GLB,, | Performed by: NURSE PRACTITIONER

## 2018-05-15 PROCEDURE — 3079F DIAST BP 80-89 MM HG: CPT | Mod: CPTII,S$GLB,, | Performed by: NURSE PRACTITIONER

## 2018-05-15 PROCEDURE — 74019 RADEX ABDOMEN 2 VIEWS: CPT | Mod: 26,,, | Performed by: RADIOLOGY

## 2018-05-15 PROCEDURE — 99999 PR PBB SHADOW E&M-EST. PATIENT-LVL III: CPT | Mod: PBBFAC,,, | Performed by: NURSE PRACTITIONER

## 2018-05-15 RX ORDER — ACETAMINOPHEN 500 MG
1 TABLET ORAL DAILY
COMMUNITY

## 2018-05-15 RX ORDER — BISACODYL 5 MG
5 TABLET, DELAYED RELEASE (ENTERIC COATED) ORAL DAILY PRN
COMMUNITY

## 2018-05-15 RX ORDER — ESOMEPRAZOLE MAGNESIUM 40 MG/1
40 CAPSULE, DELAYED RELEASE ORAL
COMMUNITY
End: 2018-08-22 | Stop reason: ALTCHOICE

## 2018-05-15 RX ORDER — LUBIPROSTONE 8 UG/1
8 CAPSULE ORAL 2 TIMES DAILY WITH MEALS
Qty: 60 CAPSULE | Refills: 2 | Status: SHIPPED | OUTPATIENT
Start: 2018-05-15 | End: 2018-05-21

## 2018-05-15 NOTE — PROGRESS NOTES
Clinic Consult:  [facility] Consultation Note    Reason for Consult:  The primary encounter diagnosis was Chronic idiopathic constipation. Diagnoses of Generalized abdominal pain and Weight loss were also pertinent to this visit.    PCP: Gonzales Shelton   No address on file    HPI:  This is a 73 y.o. male here for evaluation of the above. He is accompanied by his wife. He presents to clinic with chronic constipation that has begun to worsen. He does not have spontaneous bowel moments without help from enemas. He has about 2-3 BM per week. Associated symptoms include abdominal pain. He has also had recent weight loss. Colonoscopy is not up to date. Was due in November 2017. No hematochezia, melena, nausea, and vomiting.     Review of Systems   Constitutional: Negative for fever, malaise/fatigue and weight loss.   HENT: Negative for sore throat.    Respiratory: Negative for cough and wheezing.    Cardiovascular: Negative for chest pain and palpitations.   Gastrointestinal: Positive for abdominal pain and constipation. Negative for blood in stool, diarrhea, heartburn, melena, nausea and vomiting.   Genitourinary: Negative for dysuria and frequency.   Musculoskeletal: Negative for back pain, joint pain, myalgias and neck pain.   Skin: Negative for itching and rash.   Neurological: Negative for dizziness, speech change, seizures, loss of consciousness and headaches.   Psychiatric/Behavioral: Negative for depression and substance abuse. The patient is not nervous/anxious.        Medical History:  has a past medical history of Abnormal ECG (10/27/2015); Anxiety; CAD, multiple vessel (10/27/2015); Carotid artery occlusion; Carotid artery stenosis (6/4/2013); Coronary artery disease; Dementia; History of PTCA (10/27/2015); Hyperlipidemia; Hypertension; Myocardial infarction; Prostate cancer; Sleep apnea; Stroke; and Subdural hematoma, post-traumatic.    Surgical History:  has a past surgical history that  includes Craniotomy; Ext carotid to Int carotid bypass; Prostate surgery (2001); Coronary artery bypass graft (2001); Hemorrhoid surgery; Brain surgery; and Cardiac surgery.    Family History: family history includes Cancer in his father; Heart disease in his brother and mother..     Social History:  reports that he quit smoking about 22 years ago. His smoking use included Cigarettes. He has a 40.00 pack-year smoking history. He has never used smokeless tobacco. He reports that he does not drink alcohol or use drugs.    Allergies: Reviewed    Home Medications:   Current Outpatient Prescriptions on File Prior to Visit   Medication Sig Dispense Refill    ALPRAZolam (XANAX) 1 MG tablet TAKE ONE TABLET BY MOUTH IN THE MORNING THEN ON TABLET AT NOON THEN TWO TABLETS AT BEDTIME 120 tablet 3    buPROPion (WELLBUTRIN XL) 150 MG TB24 tablet Take 1 tablet (150 mg total) by mouth once daily. 30 tablet 11    cetirizine (ZYRTEC) 10 MG tablet Take 10 mg by mouth once daily.      citalopram (CELEXA) 40 MG tablet TAKE ONE TABLET BY MOUTH ONCE DAILY 90 tablet 4    docusate sodium (COLACE) 100 MG capsule Take 1 capsule (100 mg total) by mouth 2 (two) times daily. 60 capsule 0    melatonin 10 mg Tab Take 1 tablet by mouth every evening. Pt takes 10mg and 5mg sometimes       midodrine (PROAMATINE) 10 MG tablet TAKE ONE TABLET BY MOUTH TWICE DAILY 60 tablet 6    nitroGLYCERIN (NITROSTAT) 0.4 MG SL tablet Place 1 tablet (0.4 mg total) under the tongue every 5 (five) minutes as needed for Chest pain. 1 Tablet, Sublingual Sublingual As directed. 30 tablet 11    polyethylene glycol (GLYCOLAX) 17 gram/dose powder Take 17 g by mouth once daily. 1530 g 11    quetiapine (SEROQUEL) 25 MG Tab Take 1 tab in the AM and 2 in the PM (Patient taking differently: Take 1 tab in the AM and 1 in the PM) 270 tablet 3    rivastigmine tartrate (EXELON) 6 mg capsule Take 1 capsule (6 mg total) by mouth 2 (two) times daily. 60 capsule 11     "simvastatin (ZOCOR) 40 MG tablet TAKE ONE TABLET BY MOUTH IN THE EVENING 90 tablet 4    aspirin 81 mg Tab Take 81 mg by mouth Daily. 1 Tablet Oral Every day      omeprazole (PRILOSEC) 20 MG capsule TAKE ONE CAPSULE BY MOUTH ONCE DAILY IN THE MORNING 90 capsule 4    tamsulosin (FLOMAX) 0.4 mg Cp24 Take 1 capsule (0.4 mg total) by mouth once daily. (Patient taking differently: Take 0.4 mg by mouth as needed. ) 90 capsule 4     No current facility-administered medications on file prior to visit.        Physical Exam:  /86   Pulse 74   Ht 5' 10" (1.778 m)   Wt 62.6 kg (138 lb 0.1 oz)   BMI 19.80 kg/m²   Body mass index is 19.8 kg/m².  Physical Exam   Constitutional: He is oriented to person, place, and time and well-developed, well-nourished, and in no distress. No distress.   HENT:   Head: Normocephalic.   Eyes: Conjunctivae are normal. Pupils are equal, round, and reactive to light.   Cardiovascular: Normal rate, regular rhythm and normal heart sounds.    Pulmonary/Chest: Effort normal and breath sounds normal. No respiratory distress.   Abdominal: Soft. Bowel sounds are normal. He exhibits no distension. There is no tenderness.   Neurological: He is alert and oriented to person, place, and time. No cranial nerve deficit.   Skin: Skin is warm and dry. No rash noted.   Psychiatric: Mood and affect normal.       Labs: Pertinent labs reviewed.  CRC Screening: due    Assessment:  1. Chronic idiopathic constipation    2. Generalized abdominal pain    3. Weight loss         Recommendations:  Chronic idiopathic constipation  Generalized abdominal pain  - failure with OTC preparations including Colace, Dulcolax, and Miralax  - Will start Amitiza. Can increase dose if needed.   - recommend clean out with Miralax prep prior to starting Amitiza.   -     lubiprostone (AMITIZA) 8 MCG Cap; Take 1 capsule (8 mcg total) by mouth 2 (two) times daily with meals.  Dispense: 60 capsule; Refill: 2  -     X-Ray Abdomen Flat " And Erect; Future; Expected date: 05/15/2018    Weight loss  - need repeat colonoscopy  - patient not ready to schedule      Follow-up in about 4 weeks (around 6/12/2018).    Thank you so much for allowing me to participate in the care of Tico Hadley, KATHRYN-C

## 2018-05-16 ENCOUNTER — TELEPHONE (OUTPATIENT)
Dept: GASTROENTEROLOGY | Facility: CLINIC | Age: 73
End: 2018-05-16

## 2018-05-16 NOTE — TELEPHONE ENCOUNTER
----- Message from Hali Pacheco sent at 5/16/2018  8:34 AM CDT -----  Pt wife(Matilda) at [phone]//states she has questions regarding pt's Miralax prep//if they started it this morning can he do the whole thing today////please call asa//myke/domingo

## 2018-05-16 NOTE — TELEPHONE ENCOUNTER
Returned patients wife call and explained to her a little more about the Mirilax prep and what times to start. Patients wife  verbalized understanding.

## 2018-05-18 ENCOUNTER — TELEPHONE (OUTPATIENT)
Dept: GASTROENTEROLOGY | Facility: CLINIC | Age: 73
End: 2018-05-18

## 2018-05-18 NOTE — TELEPHONE ENCOUNTER
Returned call to pt's wife, Matilda and informed her, per Fatoumata Hadley, it is ok to use Linzess 72mcg daily instead of Amitiza.

## 2018-05-18 NOTE — TELEPHONE ENCOUNTER
----- Message from Frank Reddy sent at 5/18/2018 11:44 AM CDT -----  Contact: pt wife  Caller is requesting a back back from nurse in regards to mirilax prep did good instead of Rx  gave pt  I have a lot of samples of the linzess 72mcg cap can he take this instead. If so  How many time a day can he have this and can he have it everyday.               [phone] (home)

## 2018-05-21 ENCOUNTER — TELEPHONE (OUTPATIENT)
Dept: GASTROENTEROLOGY | Facility: CLINIC | Age: 73
End: 2018-05-21

## 2018-05-21 DIAGNOSIS — K59.04 CHRONIC IDIOPATHIC CONSTIPATION: Primary | ICD-10-CM

## 2018-05-21 NOTE — TELEPHONE ENCOUNTER
----- Message from Saritha Musa sent at 5/21/2018  1:46 PM CDT -----  Contact: pt spouse-Matilda jones was recently prescribed Linzess 72 mcg. State the patient have been taking this medication for three days and have not had a bowel movement. Please adv/call [phone].//thanks.[email or web address]

## 2018-05-21 NOTE — TELEPHONE ENCOUNTER
Informed pt's wife, per Fatoumata Hadley, to increase Linzess dose to 145mcg daily. She verbalized understanding and will let us know if this does not work.

## 2018-05-21 NOTE — TELEPHONE ENCOUNTER
Returned call to pt's wife who stated that pt has been eating well and taking Linzess 72mcg x 3 days but has not seen any results yet. She would like to know if dosage should be increased. Please advise.

## 2018-05-22 DIAGNOSIS — G31.84 MILD COGNITIVE IMPAIRMENT WITH MEMORY LOSS: ICD-10-CM

## 2018-05-22 RX ORDER — SIMVASTATIN 40 MG/1
TABLET, FILM COATED ORAL
Qty: 90 TABLET | Refills: 4 | Status: SHIPPED | OUTPATIENT
Start: 2018-05-22 | End: 2019-09-26 | Stop reason: SDUPTHER

## 2018-05-22 RX ORDER — RIVASTIGMINE TARTRATE 6 MG/1
CAPSULE ORAL
Qty: 60 CAPSULE | Refills: 11 | Status: SHIPPED | OUTPATIENT
Start: 2018-05-22 | End: 2019-04-09 | Stop reason: DRUGHIGH

## 2018-06-18 RX ORDER — MIDODRINE HYDROCHLORIDE 10 MG/1
10 TABLET ORAL 2 TIMES DAILY
Qty: 180 TABLET | Refills: 3 | Status: SHIPPED | OUTPATIENT
Start: 2018-06-18 | End: 2018-06-26 | Stop reason: SDUPTHER

## 2018-06-26 RX ORDER — MIDODRINE HYDROCHLORIDE 10 MG/1
10 TABLET ORAL 2 TIMES DAILY
Qty: 180 TABLET | Refills: 3 | Status: SHIPPED | OUTPATIENT
Start: 2018-06-26 | End: 2018-08-22 | Stop reason: SDUPTHER

## 2018-08-14 RX ORDER — OMEPRAZOLE 20 MG/1
20 CAPSULE, DELAYED RELEASE ORAL DAILY
Qty: 90 CAPSULE | Refills: 4 | Status: SHIPPED | OUTPATIENT
Start: 2018-08-14 | End: 2018-08-22 | Stop reason: SDUPTHER

## 2018-08-22 RX ORDER — MIDODRINE HYDROCHLORIDE 10 MG/1
TABLET ORAL
Qty: 60 TABLET | Refills: 6 | Status: SHIPPED | OUTPATIENT
Start: 2018-08-22 | End: 2019-07-02 | Stop reason: SDUPTHER

## 2018-08-22 RX ORDER — OMEPRAZOLE 20 MG/1
20 CAPSULE, DELAYED RELEASE ORAL DAILY
Qty: 90 CAPSULE | Refills: 4 | Status: SHIPPED | OUTPATIENT
Start: 2018-08-22 | End: 2019-09-26 | Stop reason: SDUPTHER

## 2018-08-28 ENCOUNTER — TELEPHONE (OUTPATIENT)
Dept: GASTROENTEROLOGY | Facility: CLINIC | Age: 73
End: 2018-08-28

## 2018-08-28 NOTE — TELEPHONE ENCOUNTER
Returned call to pt's wife who stated pt was doing well on Linzess and having bowel movements everyday until one week ago. Pt has not had a bowel movement in one week and would like to be advised if he should drink a bottle of mag citrate or if it would be better to do a Miralax prep. Please advise.

## 2018-08-28 NOTE — TELEPHONE ENCOUNTER
He can do a bottle of magnesium citrate. If he gets constipated again, we may need to up the Linzess dose.

## 2018-08-28 NOTE — TELEPHONE ENCOUNTER
Advised pt per Fatoumata Hadley, he can do a bottle of magnesium citrate. If he gets constipated again, we may need to up the Linzess dose. Pt and wife verbalized understanding.

## 2018-08-28 NOTE — TELEPHONE ENCOUNTER
----- Message from Taisha Musa sent at 8/28/2018 12:26 PM CDT -----  Contact: Matilda - Wife  States the pt hasn't had a bm in a week and wants to be advised, can be reached at [phone]///[email or web address]

## 2018-08-29 ENCOUNTER — TELEPHONE (OUTPATIENT)
Dept: GASTROENTEROLOGY | Facility: CLINIC | Age: 73
End: 2018-08-29

## 2018-08-29 NOTE — TELEPHONE ENCOUNTER
Returned call to pt's wife who stated that pt did have a good bowel movement. Per Fatoumata Hadley, if constipation continues she will increase Linzess dose.

## 2018-08-29 NOTE — TELEPHONE ENCOUNTER
----- Message from Blaine Mei sent at 8/29/2018  9:49 AM CDT -----  Contact: pt's spouse  She's calling in regards to the suggestion given did not work, pls advise, [phone] (cell)

## 2018-09-04 DIAGNOSIS — G31.84 MILD COGNITIVE IMPAIRMENT WITH MEMORY LOSS: ICD-10-CM

## 2018-09-04 DIAGNOSIS — F32.1 MODERATE SINGLE CURRENT EPISODE OF MAJOR DEPRESSIVE DISORDER: ICD-10-CM

## 2018-09-04 RX ORDER — QUETIAPINE FUMARATE 25 MG/1
TABLET, FILM COATED ORAL
Qty: 270 TABLET | Refills: 3 | Status: SHIPPED | OUTPATIENT
Start: 2018-09-04 | End: 2018-09-07 | Stop reason: SDUPTHER

## 2018-09-07 DIAGNOSIS — F32.1 MODERATE SINGLE CURRENT EPISODE OF MAJOR DEPRESSIVE DISORDER: ICD-10-CM

## 2018-09-07 DIAGNOSIS — G31.84 MILD COGNITIVE IMPAIRMENT WITH MEMORY LOSS: ICD-10-CM

## 2018-09-07 RX ORDER — QUETIAPINE FUMARATE 25 MG/1
TABLET, FILM COATED ORAL
Qty: 270 TABLET | Refills: 3 | Status: SHIPPED | OUTPATIENT
Start: 2018-09-07 | End: 2019-11-21 | Stop reason: SDUPTHER

## 2018-09-24 ENCOUNTER — TELEPHONE (OUTPATIENT)
Dept: INTERNAL MEDICINE | Facility: CLINIC | Age: 73
End: 2018-09-24

## 2018-09-24 DIAGNOSIS — Z12.5 ENCOUNTER FOR SCREENING FOR MALIGNANT NEOPLASM OF PROSTATE: ICD-10-CM

## 2018-09-24 DIAGNOSIS — I70.0 ATHEROSCLEROSIS OF AORTA: Primary | ICD-10-CM

## 2018-09-24 DIAGNOSIS — R73.9 HYPERGLYCEMIA: ICD-10-CM

## 2018-09-24 DIAGNOSIS — I65.23 BILATERAL CAROTID ARTERY STENOSIS: ICD-10-CM

## 2018-09-24 NOTE — TELEPHONE ENCOUNTER
PATEINTS WIFE MATILDE RETURNED CALL. SHE SAID PATIENT IS DOING WELL AND HAVING NO ISSUES, SHE WOULD LIKE TO SCHEDULE ANNUAL AND ANNUAL LABS. BOTH APPT SCHEDULED AND APPT REMINDERS MAILED TO PATIENT.

## 2018-09-24 NOTE — TELEPHONE ENCOUNTER
----- Message from Magaly Alcala sent at 9/24/2018 12:50 PM CDT -----  Mrs. Reeves called  does not have patient portal and cannot see his records. Wants to know if  is due for anything such as labs, appt, etc. Call her at [phone]. tc

## 2018-11-01 DIAGNOSIS — F32.1 MODERATE SINGLE CURRENT EPISODE OF MAJOR DEPRESSIVE DISORDER: ICD-10-CM

## 2018-11-01 RX ORDER — BUPROPION HYDROCHLORIDE 150 MG/1
150 TABLET ORAL DAILY
Qty: 90 TABLET | Refills: 4 | Status: SHIPPED | OUTPATIENT
Start: 2018-11-01 | End: 2020-06-22

## 2018-11-07 ENCOUNTER — HOSPITAL ENCOUNTER (OUTPATIENT)
Dept: RADIOLOGY | Facility: HOSPITAL | Age: 73
Discharge: HOME | End: 2018-11-07
Attending: NURSE PRACTITIONER
Payer: MEDICARE

## 2018-11-07 ENCOUNTER — OFFICE VISIT (OUTPATIENT)
Dept: INTERNAL MEDICINE | Facility: CLINIC | Age: 73
End: 2018-11-07
Payer: MEDICARE

## 2018-11-07 VITALS
SYSTOLIC BLOOD PRESSURE: 148 MMHG | BODY MASS INDEX: 22.73 KG/M2 | DIASTOLIC BLOOD PRESSURE: 70 MMHG | HEART RATE: 76 BPM | WEIGHT: 158.75 LBS | TEMPERATURE: 98 F | HEIGHT: 70 IN

## 2018-11-07 DIAGNOSIS — K59.09 CHRONIC CONSTIPATION: Primary | ICD-10-CM

## 2018-11-07 DIAGNOSIS — K59.09 CHRONIC CONSTIPATION: ICD-10-CM

## 2018-11-07 DIAGNOSIS — R11.0 NAUSEA: ICD-10-CM

## 2018-11-07 PROCEDURE — 99214 OFFICE O/P EST MOD 30 MIN: CPT | Mod: HCNC,S$GLB,, | Performed by: NURSE PRACTITIONER

## 2018-11-07 PROCEDURE — 74018 RADEX ABDOMEN 1 VIEW: CPT | Mod: TC,FY,HCNC,PO

## 2018-11-07 PROCEDURE — 1101F PT FALLS ASSESS-DOCD LE1/YR: CPT | Mod: CPTII,HCNC,S$GLB, | Performed by: NURSE PRACTITIONER

## 2018-11-07 PROCEDURE — 3077F SYST BP >= 140 MM HG: CPT | Mod: CPTII,HCNC,S$GLB, | Performed by: NURSE PRACTITIONER

## 2018-11-07 PROCEDURE — S0119 ONDANSETRON 4 MG: HCPCS | Mod: HCNC,S$GLB,, | Performed by: NURSE PRACTITIONER

## 2018-11-07 PROCEDURE — 3078F DIAST BP <80 MM HG: CPT | Mod: CPTII,HCNC,S$GLB, | Performed by: NURSE PRACTITIONER

## 2018-11-07 PROCEDURE — 99999 PR PBB SHADOW E&M-EST. PATIENT-LVL IV: CPT | Mod: PBBFAC,HCNC,, | Performed by: NURSE PRACTITIONER

## 2018-11-07 PROCEDURE — 74018 RADEX ABDOMEN 1 VIEW: CPT | Mod: 26,HCNC,, | Performed by: RADIOLOGY

## 2018-11-07 RX ORDER — ONDANSETRON 4 MG/1
4 TABLET, ORALLY DISINTEGRATING ORAL
Status: COMPLETED | OUTPATIENT
Start: 2018-11-07 | End: 2018-11-07

## 2018-11-07 RX ORDER — ONDANSETRON 4 MG/1
4 TABLET, ORALLY DISINTEGRATING ORAL EVERY 8 HOURS PRN
Qty: 10 TABLET | Refills: 0 | Status: SHIPPED | OUTPATIENT
Start: 2018-11-07 | End: 2018-11-19 | Stop reason: ALTCHOICE

## 2018-11-07 RX ADMIN — ONDANSETRON 4 MG: 4 TABLET, ORALLY DISINTEGRATING ORAL at 07:11

## 2018-11-08 ENCOUNTER — TELEPHONE (OUTPATIENT)
Dept: INTERNAL MEDICINE | Facility: CLINIC | Age: 73
End: 2018-11-08

## 2018-11-08 PROBLEM — R10.9 ABDOMINAL PAIN: Status: ACTIVE | Noted: 2018-11-08

## 2018-11-08 NOTE — TELEPHONE ENCOUNTER
Labs have a couple of abnormalities.    The sugars is elevated at 145.  Was he fasting?  Is he taking any steroids?    His wbc count is also significantly elevated.  Is he having any signs of infection (fever, chills, cough, congestion, urinary sympotms, etc)?  Steroids?    Will decide on follow up testing and labs once I have these answers.

## 2018-11-08 NOTE — TELEPHONE ENCOUNTER
Yes he came in yesterday very sick saw Sandra.  He was fasting.  No steroids.  Did not feel well at all.  Please advise.  Left message for wife to return call.

## 2018-11-08 NOTE — TELEPHONE ENCOUNTER
With the elevated wbc count and the abdomnial pain he was seeing Sandra for I would recommend ER evaluation.  His wbcs are way high.

## 2018-11-11 ENCOUNTER — HOSPITAL ENCOUNTER (INPATIENT)
Facility: HOSPITAL | Age: 73
LOS: 2 days | Discharge: HOME | DRG: 419 | End: 2018-11-13
Attending: EMERGENCY MEDICINE | Admitting: INTERNAL MEDICINE
Payer: MEDICARE

## 2018-11-11 DIAGNOSIS — Z86.69 HISTORY OF PARKINSON'S DISEASE: ICD-10-CM

## 2018-11-11 DIAGNOSIS — I10 ESSENTIAL HYPERTENSION: ICD-10-CM

## 2018-11-11 DIAGNOSIS — R07.81 RIB PAIN: ICD-10-CM

## 2018-11-11 DIAGNOSIS — I10 HTN (HYPERTENSION): ICD-10-CM

## 2018-11-11 DIAGNOSIS — K80.00 CALCULUS OF GALLBLADDER WITH ACUTE CHOLECYSTITIS WITHOUT OBSTRUCTION: Primary | ICD-10-CM

## 2018-11-11 LAB
ALBUMIN SERPL BCP-MCNC: 3.2 G/DL
ALP SERPL-CCNC: 154 U/L
ALT SERPL W/O P-5'-P-CCNC: 105 U/L
ANION GAP SERPL CALC-SCNC: 10 MMOL/L
AST SERPL-CCNC: 227 U/L
BASOPHILS # BLD AUTO: 0.02 K/UL
BASOPHILS NFR BLD: 0.1 %
BILIRUB SERPL-MCNC: 2.2 MG/DL
BILIRUB UR QL STRIP: NEGATIVE
BUN SERPL-MCNC: 14 MG/DL
CALCIUM SERPL-MCNC: 9 MG/DL
CHLORIDE SERPL-SCNC: 102 MMOL/L
CLARITY UR: CLEAR
CO2 SERPL-SCNC: 23 MMOL/L
COLOR UR: YELLOW
CREAT SERPL-MCNC: 0.8 MG/DL
DIFFERENTIAL METHOD: ABNORMAL
EOSINOPHIL # BLD AUTO: 0.1 K/UL
EOSINOPHIL NFR BLD: 0.6 %
ERYTHROCYTE [DISTWIDTH] IN BLOOD BY AUTOMATED COUNT: 12.3 %
EST. GFR  (AFRICAN AMERICAN): >60 ML/MIN/1.73 M^2
EST. GFR  (NON AFRICAN AMERICAN): >60 ML/MIN/1.73 M^2
GLUCOSE SERPL-MCNC: 127 MG/DL
GLUCOSE UR QL STRIP: NEGATIVE
HCT VFR BLD AUTO: 36.6 %
HGB BLD-MCNC: 12.7 G/DL
HGB UR QL STRIP: NEGATIVE
KETONES UR QL STRIP: NEGATIVE
LACTATE SERPL-SCNC: 1 MMOL/L
LEUKOCYTE ESTERASE UR QL STRIP: NEGATIVE
LYMPHOCYTES # BLD AUTO: 0.7 K/UL
LYMPHOCYTES NFR BLD: 4.4 %
MCH RBC QN AUTO: 33.3 PG
MCHC RBC AUTO-ENTMCNC: 34.7 G/DL
MCV RBC AUTO: 96 FL
MONOCYTES # BLD AUTO: 1.3 K/UL
MONOCYTES NFR BLD: 8.9 %
NEUTROPHILS # BLD AUTO: 12.9 K/UL
NEUTROPHILS NFR BLD: 86 %
NITRITE UR QL STRIP: NEGATIVE
PH UR STRIP: 8 [PH] (ref 5–8)
PLATELET # BLD AUTO: 215 K/UL
PMV BLD AUTO: 9.5 FL
POTASSIUM SERPL-SCNC: 4.3 MMOL/L
PROCALCITONIN SERPL IA-MCNC: 0.13 NG/ML
PROT SERPL-MCNC: 7.3 G/DL
PROT UR QL STRIP: NEGATIVE
RBC # BLD AUTO: 3.81 M/UL
SODIUM SERPL-SCNC: 135 MMOL/L
SP GR UR STRIP: 1.01 (ref 1–1.03)
URN SPEC COLLECT METH UR: NORMAL
UROBILINOGEN UR STRIP-ACNC: NEGATIVE EU/DL
WBC # BLD AUTO: 14.98 K/UL

## 2018-11-11 PROCEDURE — 84145 PROCALCITONIN (PCT): CPT | Mod: HCNC

## 2018-11-11 PROCEDURE — 81003 URINALYSIS AUTO W/O SCOPE: CPT | Mod: HCNC

## 2018-11-11 PROCEDURE — 96365 THER/PROPH/DIAG IV INF INIT: CPT | Mod: HCNC

## 2018-11-11 PROCEDURE — 25000003 PHARM REV CODE 250: Mod: HCNC | Performed by: EMERGENCY MEDICINE

## 2018-11-11 PROCEDURE — 25000003 PHARM REV CODE 250: Mod: HCNC | Performed by: NURSE PRACTITIONER

## 2018-11-11 PROCEDURE — 83605 ASSAY OF LACTIC ACID: CPT | Mod: HCNC

## 2018-11-11 PROCEDURE — 63600175 PHARM REV CODE 636 W HCPCS: Mod: HCNC | Performed by: EMERGENCY MEDICINE

## 2018-11-11 PROCEDURE — 99223 1ST HOSP IP/OBS HIGH 75: CPT | Mod: HCNC,,, | Performed by: SURGERY

## 2018-11-11 PROCEDURE — 93010 ELECTROCARDIOGRAM REPORT: CPT | Mod: HCNC,,, | Performed by: INTERNAL MEDICINE

## 2018-11-11 PROCEDURE — 99285 EMERGENCY DEPT VISIT HI MDM: CPT | Mod: 25,HCNC

## 2018-11-11 PROCEDURE — 80053 COMPREHEN METABOLIC PANEL: CPT | Mod: HCNC

## 2018-11-11 PROCEDURE — 11000001 HC ACUTE MED/SURG PRIVATE ROOM: Mod: HCNC

## 2018-11-11 PROCEDURE — 85025 COMPLETE CBC W/AUTO DIFF WBC: CPT | Mod: HCNC

## 2018-11-11 PROCEDURE — 87040 BLOOD CULTURE FOR BACTERIA: CPT | Mod: HCNC

## 2018-11-11 PROCEDURE — 93005 ELECTROCARDIOGRAM TRACING: CPT | Mod: HCNC

## 2018-11-11 PROCEDURE — 96361 HYDRATE IV INFUSION ADD-ON: CPT | Mod: HCNC

## 2018-11-11 PROCEDURE — 25000003 PHARM REV CODE 250: Mod: HCNC | Performed by: INTERNAL MEDICINE

## 2018-11-11 PROCEDURE — 96375 TX/PRO/DX INJ NEW DRUG ADDON: CPT | Mod: HCNC

## 2018-11-11 RX ORDER — RIVASTIGMINE TARTRATE 1.5 MG/1
6 CAPSULE ORAL 2 TIMES DAILY
Status: DISCONTINUED | OUTPATIENT
Start: 2018-11-11 | End: 2018-11-12

## 2018-11-11 RX ORDER — SIMVASTATIN 20 MG/1
40 TABLET, FILM COATED ORAL NIGHTLY
Status: DISCONTINUED | OUTPATIENT
Start: 2018-11-11 | End: 2018-11-13 | Stop reason: HOSPADM

## 2018-11-11 RX ORDER — LIDOCAINE HYDROCHLORIDE 10 MG/ML
1 INJECTION, SOLUTION EPIDURAL; INFILTRATION; INTRACAUDAL; PERINEURAL ONCE
Status: DISCONTINUED | OUTPATIENT
Start: 2018-11-11 | End: 2018-11-12

## 2018-11-11 RX ORDER — MIDODRINE HYDROCHLORIDE 5 MG/1
10 TABLET ORAL
Status: DISCONTINUED | OUTPATIENT
Start: 2018-11-11 | End: 2018-11-12

## 2018-11-11 RX ORDER — QUETIAPINE FUMARATE 25 MG/1
25 TABLET, FILM COATED ORAL DAILY
Status: DISCONTINUED | OUTPATIENT
Start: 2018-11-12 | End: 2018-11-13 | Stop reason: HOSPADM

## 2018-11-11 RX ORDER — CITALOPRAM 20 MG/1
20 TABLET, FILM COATED ORAL NIGHTLY
Status: DISCONTINUED | OUTPATIENT
Start: 2018-11-11 | End: 2018-11-12

## 2018-11-11 RX ORDER — SODIUM CHLORIDE 9 MG/ML
1000 INJECTION, SOLUTION INTRAVENOUS
Status: COMPLETED | OUTPATIENT
Start: 2018-11-11 | End: 2018-11-11

## 2018-11-11 RX ORDER — DOCUSATE SODIUM 100 MG/1
100 CAPSULE, LIQUID FILLED ORAL 2 TIMES DAILY
Status: DISCONTINUED | OUTPATIENT
Start: 2018-11-11 | End: 2018-11-13 | Stop reason: HOSPADM

## 2018-11-11 RX ORDER — SODIUM CHLORIDE 9 MG/ML
INJECTION, SOLUTION INTRAVENOUS CONTINUOUS
Status: DISCONTINUED | OUTPATIENT
Start: 2018-11-11 | End: 2018-11-11

## 2018-11-11 RX ORDER — SODIUM CHLORIDE 9 MG/ML
INJECTION, SOLUTION INTRAVENOUS CONTINUOUS
Status: DISCONTINUED | OUTPATIENT
Start: 2018-11-11 | End: 2018-11-12

## 2018-11-11 RX ORDER — BUPROPION HYDROCHLORIDE 150 MG/1
150 TABLET ORAL NIGHTLY
Status: DISCONTINUED | OUTPATIENT
Start: 2018-11-11 | End: 2018-11-13 | Stop reason: HOSPADM

## 2018-11-11 RX ORDER — LUBIPROSTONE 24 UG/1
24 CAPSULE ORAL
Status: DISCONTINUED | OUTPATIENT
Start: 2018-11-12 | End: 2018-11-13 | Stop reason: HOSPADM

## 2018-11-11 RX ORDER — CITALOPRAM 20 MG/1
20 TABLET, FILM COATED ORAL DAILY
Status: DISCONTINUED | OUTPATIENT
Start: 2018-11-12 | End: 2018-11-11

## 2018-11-11 RX ADMIN — SODIUM CHLORIDE: 0.9 INJECTION, SOLUTION INTRAVENOUS at 06:11

## 2018-11-11 RX ADMIN — CITALOPRAM HYDROBROMIDE 20 MG: 20 TABLET ORAL at 08:11

## 2018-11-11 RX ADMIN — SIMVASTATIN 40 MG: 20 TABLET, FILM COATED ORAL at 08:11

## 2018-11-11 RX ADMIN — LORAZEPAM 1 MG: 2 INJECTION INTRAMUSCULAR; INTRAVENOUS at 04:11

## 2018-11-11 RX ADMIN — PIPERACILLIN SODIUM AND TAZOBACTAM SODIUM 4.5 G: 4; .5 INJECTION, POWDER, LYOPHILIZED, FOR SOLUTION INTRAVENOUS at 11:11

## 2018-11-11 RX ADMIN — RIVASTIGMINE TARTRATE 6 MG: 1.5 CAPSULE ORAL at 09:11

## 2018-11-11 RX ADMIN — SODIUM CHLORIDE 1000 ML: 0.9 INJECTION, SOLUTION INTRAVENOUS at 04:11

## 2018-11-11 RX ADMIN — PIPERACILLIN SODIUM AND TAZOBACTAM SODIUM 4.5 G: 4; .5 INJECTION, POWDER, LYOPHILIZED, FOR SOLUTION INTRAVENOUS at 04:11

## 2018-11-11 RX ADMIN — SODIUM CHLORIDE 1000 ML: 0.9 INJECTION, SOLUTION INTRAVENOUS at 02:11

## 2018-11-11 RX ADMIN — BUPROPION HYDROCHLORIDE 150 MG: 150 TABLET, FILM COATED, EXTENDED RELEASE ORAL at 08:11

## 2018-11-11 RX ADMIN — DOCUSATE SODIUM 100 MG: 100 CAPSULE, LIQUID FILLED ORAL at 08:11

## 2018-11-11 NOTE — CONSULTS
[facility] -   General Surgery  Consult Note    Patient Name: Tico Reeves  MRN: [id number]  Code Status: Full Code  Admission Date: 11/11/2018  Hospital Length of Stay: 0 days  Attending Physician: Jesus Diaz MD  Primary Care Provider: Gonzales Shelton MD    Patient information was obtained from patient, spouse/SO and ER records.     Consults  Subjective:     Principal Problem: Calculus of gallbladder with acute cholecystitis without obstruction    History of Present Illness: Tico is 73-year-old white male patient was being seen at the request of emergency room physician for the evaluation of known right upper quadrant abdominal pain associated with fever.  The patient was seen in the emergency department  3 days ago for the evaluation of abdominal pain. He had CT scan of the abdomen which showed no definitive findings suggestive of acute cholecystitis and was discharged home.  He returned to the emergency department today after recurrence of severe right upper quadrant pain associated with nausea.  He has Parkinson's disease with dementia as well as known coronary arterial disease as his past medical history.  He is verbal and able to carry on a conversation.  His wife is at his bedside and explaining recent events.  He denies any abdominal surgery.    No current facility-administered medications on file prior to encounter.      Current Outpatient Medications on File Prior to Encounter   Medication Sig    ALPRAZolam (XANAX) 1 MG tablet TAKE ONE TABLET BY MOUTH IN THE MORNING THEN ON TABLET AT NOON THEN TWO TABLETS AT BEDTIME    aspirin 81 mg Tab Take 81 mg by mouth Daily. 1 Tablet Oral Every day    bisacodyl (DULCOLAX) 5 mg EC tablet Take 5 mg by mouth daily as needed for Constipation.    buPROPion (WELLBUTRIN XL) 150 MG TB24 tablet Take 1 tablet (150 mg total) by mouth once daily.    cetirizine (ZYRTEC) 10 MG tablet Take 10 mg by mouth once daily.    cholecalciferol, vitamin D3, (VITAMIN  D3) 2,000 unit Cap Take 1 capsule by mouth once daily.    citalopram (CELEXA) 40 MG tablet TAKE ONE TABLET BY MOUTH ONCE DAILY    docusate sodium (COLACE) 100 MG capsule Take 1 capsule (100 mg total) by mouth 2 (two) times daily.    linaclotide (LINZESS) 145 mcg Cap capsule Take 1 capsule (145 mcg total) by mouth once daily.    melatonin 10 mg Tab Take 1 tablet by mouth every evening. Pt takes 10mg and 5mg sometimes     midodrine (PROAMATINE) 10 MG tablet TAKE ONE TABLET BY MOUTH TWICE DAILY    nitroGLYCERIN (NITROSTAT) 0.4 MG SL tablet Place 1 tablet (0.4 mg total) under the tongue every 5 (five) minutes as needed for Chest pain. 1 Tablet, Sublingual Sublingual As directed.    omeprazole (PRILOSEC) 20 MG capsule Take 1 capsule (20 mg total) by mouth once daily.    ondansetron (ZOFRAN-ODT) 4 MG TbDL Take 1 tablet (4 mg total) by mouth every 8 (eight) hours as needed (nausea).    polyethylene glycol (GLYCOLAX) 17 gram/dose powder Take 17 g by mouth once daily.    QUEtiapine (SEROQUEL) 25 MG Tab Take 1 tab in the AM and 1 in the PM    rivastigmine tartrate (EXELON) 6 mg capsule TAKE ONE CAPSULE BY MOUTH TWICE DAILY    simvastatin (ZOCOR) 40 MG tablet TAKE ONE TABLET BY MOUTH IN THE EVENING    tamsulosin (FLOMAX) 0.4 mg Cp24 Take 1 capsule (0.4 mg total) by mouth once daily. (Patient taking differently: Take 0.4 mg by mouth as needed. )       Review of patient's allergies indicates:   Allergen Reactions    Lipitor [atorvastatin]      Other reaction(s): Muscle pain       Past Medical History:   Diagnosis Date    Abnormal ECG 10/27/2015    Anxiety     CAD, multiple vessel 10/27/2015    Carotid artery occlusion     Carotid artery stenosis 6/4/2013    Coronary artery disease     Dementia     Vacular & Frontal lobe    History of PTCA 10/27/2015    Hyperlipidemia     Hypertension     Myocardial infarction     Prostate cancer     Sleep apnea     Stroke     unsure if pt has residual weakness     Subdural hematoma, post-traumatic      Past Surgical History:   Procedure Laterality Date    BRAIN SURGERY      CARDIAC SURGERY      CORONARY ARTERY BYPASS GRAFT      CRANIOTOMY      Ext carotid to Int carotid bypass      HEMORRHOID SURGERY      PROSTATE SURGERY      Radical prostatectomy for prostate cancer    URETHROTOMY-DIRECT VISUAL INTERNAL (DVIU) N/A 2015    Performed by Agusto Arroyo IV, MD at [facility] OR     Family History     Problem Relation (Age of Onset)    Cancer Father    Heart disease Mother, Brother        Tobacco Use    Smoking status: Former Smoker     Packs/day: 2.00     Years: 20.00     Pack years: 40.00     Types: Cigarettes     Last attempt to quit: 1995     Years since quittin.4    Smokeless tobacco: Never Used   Substance and Sexual Activity    Alcohol use: No     Alcohol/week: 0.0 oz     Comment: Rare beer    Drug use: No    Sexual activity: Yes     Partners: Female     Review of Systems   Constitutional: Positive for activity change, appetite change and fever. Negative for chills.   HENT: Negative for sore throat and trouble swallowing.    Eyes: Negative.    Respiratory: Negative for cough and shortness of breath.    Cardiovascular: Negative.    Gastrointestinal: Positive for abdominal distention, abdominal pain and nausea. Negative for vomiting.   Endocrine: Negative.    Genitourinary: Negative.    Musculoskeletal: Negative.    Skin: Negative.    Allergic/Immunologic: Negative.    Neurological: Negative.    Hematological: Does not bruise/bleed easily.   Psychiatric/Behavioral: Negative.      Objective:     Vital Signs (Most Recent):  Temp: 98.5 °F (36.9 °C) (18)  Pulse: 88 (18)  Resp: 16 (18)  BP: (!) 155/72 (18)  SpO2: 95 % (18) Vital Signs (24h Range):  Temp:  [98.5 °F (36.9 °C)-100.2 °F (37.9 °C)] 98.5 °F (36.9 °C)  Pulse:  [83-91] 88  Resp:  [16-20] 16  SpO2:  [93 %-96 %] 95 %  BP:  (117-181)/(65-83) 155/72     Weight: 72.2 kg (159 lb 2.8 oz)  Body mass index is 21.59 kg/m².    Physical Exam   Constitutional: He is oriented to person, place, and time. He appears well-developed and well-nourished.   HENT:   Head: Normocephalic.   Right Ear: External ear normal.   Left Ear: External ear normal.   Nose: Nose normal.   Eyes: Pupils are equal, round, and reactive to light. No scleral icterus.   Neck: Normal range of motion. Neck supple. No thyromegaly present.   Cardiovascular: Normal rate, regular rhythm and normal heart sounds.   No murmur heard.  Pulmonary/Chest: Effort normal and breath sounds normal.   Abdominal: Soft. Bowel sounds are normal. There is tenderness. There is no guarding.   Musculoskeletal: Normal range of motion.   Lymphadenopathy:     He has no cervical adenopathy.   Neurological: He is alert and oriented to person, place, and time.   Skin: Skin is warm and dry.       Significant Labs:  CBC:   Recent Labs   Lab 11/08/18  1250   WBC 14.82*   RBC 3.74*   HGB 12.4*   HCT 36.4*      MCV 97   MCH 33.2*   MCHC 34.1     CMP:   Recent Labs   Lab 11/08/18  1250   *   CALCIUM 8.8   ALBUMIN 3.2*   PROT 6.5      K 3.9   CO2 25      BUN 24*   CREATININE 1.2   ALKPHOS 66   ALT 14   AST 20   BILITOT 0.8       Significant Diagnostics:  I have reviewed and interpreted all pertinent imaging results/findings within the past 24 hours.    Assessment/Plan:     * Calculus of gallbladder with acute cholecystitis without obstruction    Acute cholecystitis due to cholelithiasis.  The plan is to proceed with robotic cholecystectomy.  The surgery will be scheduled for November 12, 2018 at 2:00 p.m..  The risk and the benefit of the procedure were fully addressed with the patient and his spouse including open cholecystectomy.  They have agreed to proceed as planned.       VTE Risk Mitigation (From admission, onward)        Ordered     Place sequential compression device  Until  discontinued      11/11/18 1719     IP VTE HIGH RISK PATIENT  Once      11/11/18 1719     Place sequential compression device  Until discontinued      11/11/18 1650          Thank you for your consult. I will follow-up with patient. Please contact us if you have any additional questions.    Wojciech Leblanc MD  General Surgery  [facility]

## 2018-11-11 NOTE — ED NOTES
Patient moved to ED room 11, patient assisted onto stretcher and changed into a gown. Patient placed on cardiac monitor, continuous pulse oximetry and automatic blood pressure cuff. Bed placed in low locked position, side rails up x 2, call light is within reach of patient or family, orientation to room and explanation of wait provided to family and patient, alarms set and turned on for monitor and pulse ox, awaiting MD evaluation and orders, will continue to monitor.    Patient identifies self as Tico Reeves      LOC: The patient is awake, alert and aware of environment with an appropriate affect, the patient is oriented x 3 and speaking appropriately.  APPEARANCE: Patient resting comfortably and in no acute distress, patient is clean and well groomed, patient's clothing is properly fastened.  SKIN: The skin is warm and dry, color consistent with ethnicity, patient has normal skin turgor and moist mucus membranes, skin intact, no breakdown or bruising noted.  MUSCULOSKELETAL: Patient moving all extremities well, no obvious swelling or deformities noted.  Pt reports right back and side pain.   RESPIRATORY: Airway is open and patent, respirations are spontaneous, patient has a normal effort and rate, no accessory muscle use noted.  CARDIAC: Patient has a normal rate and rhythm, no periphreal edema noted, capillary refill < 3 seconds.  ABDOMEN: Soft and non tender to palpation, no distention noted.  NEUROLOGIC: PERRL, 3 mm bilaterally, eyes open spontaneously, behavior appropriate to situation, follows commands, facial expression symmetrical, bilateral hand grasp equal and even, purposeful motor response noted, normal sensation in all extremities when touched with a finger.  Pt has Parkinson's per wife.

## 2018-11-11 NOTE — SUBJECTIVE & OBJECTIVE
No current facility-administered medications on file prior to encounter.      Current Outpatient Medications on File Prior to Encounter   Medication Sig    ALPRAZolam (XANAX) 1 MG tablet TAKE ONE TABLET BY MOUTH IN THE MORNING THEN ON TABLET AT NOON THEN TWO TABLETS AT BEDTIME    aspirin 81 mg Tab Take 81 mg by mouth Daily. 1 Tablet Oral Every day    bisacodyl (DULCOLAX) 5 mg EC tablet Take 5 mg by mouth daily as needed for Constipation.    buPROPion (WELLBUTRIN XL) 150 MG TB24 tablet Take 1 tablet (150 mg total) by mouth once daily.    cetirizine (ZYRTEC) 10 MG tablet Take 10 mg by mouth once daily.    cholecalciferol, vitamin D3, (VITAMIN D3) 2,000 unit Cap Take 1 capsule by mouth once daily.    citalopram (CELEXA) 40 MG tablet TAKE ONE TABLET BY MOUTH ONCE DAILY    docusate sodium (COLACE) 100 MG capsule Take 1 capsule (100 mg total) by mouth 2 (two) times daily.    linaclotide (LINZESS) 145 mcg Cap capsule Take 1 capsule (145 mcg total) by mouth once daily.    melatonin 10 mg Tab Take 1 tablet by mouth every evening. Pt takes 10mg and 5mg sometimes     midodrine (PROAMATINE) 10 MG tablet TAKE ONE TABLET BY MOUTH TWICE DAILY    nitroGLYCERIN (NITROSTAT) 0.4 MG SL tablet Place 1 tablet (0.4 mg total) under the tongue every 5 (five) minutes as needed for Chest pain. 1 Tablet, Sublingual Sublingual As directed.    omeprazole (PRILOSEC) 20 MG capsule Take 1 capsule (20 mg total) by mouth once daily.    ondansetron (ZOFRAN-ODT) 4 MG TbDL Take 1 tablet (4 mg total) by mouth every 8 (eight) hours as needed (nausea).    polyethylene glycol (GLYCOLAX) 17 gram/dose powder Take 17 g by mouth once daily.    QUEtiapine (SEROQUEL) 25 MG Tab Take 1 tab in the AM and 1 in the PM    rivastigmine tartrate (EXELON) 6 mg capsule TAKE ONE CAPSULE BY MOUTH TWICE DAILY    simvastatin (ZOCOR) 40 MG tablet TAKE ONE TABLET BY MOUTH IN THE EVENING    tamsulosin (FLOMAX) 0.4 mg Cp24 Take 1 capsule (0.4 mg total) by mouth  once daily. (Patient taking differently: Take 0.4 mg by mouth as needed. )       Review of patient's allergies indicates:   Allergen Reactions    Lipitor [atorvastatin]      Other reaction(s): Muscle pain       Past Medical History:   Diagnosis Date    Abnormal ECG 10/27/2015    Anxiety     CAD, multiple vessel 10/27/2015    Carotid artery occlusion     Carotid artery stenosis 2013    Coronary artery disease     Dementia     Vacular & Frontal lobe    History of PTCA 10/27/2015    Hyperlipidemia     Hypertension     Myocardial infarction     Prostate cancer     Sleep apnea     Stroke     unsure if pt has residual weakness    Subdural hematoma, post-traumatic      Past Surgical History:   Procedure Laterality Date    BRAIN SURGERY      CARDIAC SURGERY      CORONARY ARTERY BYPASS GRAFT      CRANIOTOMY      Ext carotid to Int carotid bypass      HEMORRHOID SURGERY      PROSTATE SURGERY      Radical prostatectomy for prostate cancer    URETHROTOMY-DIRECT VISUAL INTERNAL (DVIU) N/A 2015    Performed by Agusto Arroyo IV, MD at [facility] OR     Family History     Problem Relation (Age of Onset)    Cancer Father    Heart disease Mother, Brother        Tobacco Use    Smoking status: Former Smoker     Packs/day: 2.00     Years: 20.00     Pack years: 40.00     Types: Cigarettes     Last attempt to quit: 1995     Years since quittin.4    Smokeless tobacco: Never Used   Substance and Sexual Activity    Alcohol use: No     Alcohol/week: 0.0 oz     Comment: Rare beer    Drug use: No    Sexual activity: Yes     Partners: Female     Review of Systems   Constitutional: Positive for activity change, appetite change and fever. Negative for chills.   HENT: Negative for sore throat and trouble swallowing.    Eyes: Negative.    Respiratory: Negative for cough and shortness of breath.    Cardiovascular: Negative.    Gastrointestinal: Positive for abdominal distention, abdominal pain and  nausea. Negative for vomiting.   Endocrine: Negative.    Genitourinary: Negative.    Musculoskeletal: Negative.    Skin: Negative.    Allergic/Immunologic: Negative.    Neurological: Negative.    Hematological: Does not bruise/bleed easily.   Psychiatric/Behavioral: Negative.      Objective:     Vital Signs (Most Recent):  Temp: 98.5 °F (36.9 °C) (11/11/18 1656)  Pulse: 88 (11/11/18 1656)  Resp: 16 (11/11/18 1656)  BP: (!) 155/72 (11/11/18 1656)  SpO2: 95 % (11/11/18 1656) Vital Signs (24h Range):  Temp:  [98.5 °F (36.9 °C)-100.2 °F (37.9 °C)] 98.5 °F (36.9 °C)  Pulse:  [83-91] 88  Resp:  [16-20] 16  SpO2:  [93 %-96 %] 95 %  BP: (117-181)/(65-83) 155/72     Weight: 72.2 kg (159 lb 2.8 oz)  Body mass index is 21.59 kg/m².    Physical Exam   Constitutional: He is oriented to person, place, and time. He appears well-developed and well-nourished.   HENT:   Head: Normocephalic.   Right Ear: External ear normal.   Left Ear: External ear normal.   Nose: Nose normal.   Eyes: Pupils are equal, round, and reactive to light. No scleral icterus.   Neck: Normal range of motion. Neck supple. No thyromegaly present.   Cardiovascular: Normal rate, regular rhythm and normal heart sounds.   No murmur heard.  Pulmonary/Chest: Effort normal and breath sounds normal.   Abdominal: Soft. Bowel sounds are normal. There is tenderness. There is no guarding.   Musculoskeletal: Normal range of motion.   Lymphadenopathy:     He has no cervical adenopathy.   Neurological: He is alert and oriented to person, place, and time.   Skin: Skin is warm and dry.       Significant Labs:  CBC:   Recent Labs   Lab 11/08/18  1250   WBC 14.82*   RBC 3.74*   HGB 12.4*   HCT 36.4*      MCV 97   MCH 33.2*   MCHC 34.1     CMP:   Recent Labs   Lab 11/08/18  1250   *   CALCIUM 8.8   ALBUMIN 3.2*   PROT 6.5      K 3.9   CO2 25      BUN 24*   CREATININE 1.2   ALKPHOS 66   ALT 14   AST 20   BILITOT 0.8       Significant Diagnostics:  I have  reviewed and interpreted all pertinent imaging results/findings within the past 24 hours.

## 2018-11-11 NOTE — HPI
Tico Reeves is a 73 y.o. male patient with a PMHx of Parkinson's, CAD, HTN, labile HTN, dementia, who presented to the ER for evaluation of RUQ abdominal pain which onset gradually about a week ago. Pain is not exacerbated with eating. Associated sxs included nausea, fever. Patient denies any vomiting, diarrhea, lightheadedness, headache, SOB, CP, and all other sxs at this time. Pt's wife states the pt fell 4 days ago and says he hit his head. Pt was seen in the ED on 11/8/18 for evaluation of elevated white count and abdominal pain and seen by Dr. Greenwood, , discharged home. In ER, u/s GB showed multiple shadow stones, + delarosa sign.  Pt was dx with constipation and calculus of gallbladder without cholecystitis. Dr. Leblanc has been consulted and plans surgery tomorrow. WBC 14, Liver enzymes mildly elevated, Temp 100.2. Wife doesn't want  to know he has Parkinsonism. She reports patient has labile HTN with drastic drops in B/P and takes his B/P at home every 30 minutes and gives him Midodrine 10 mg up to TID. Epic/Labs are unavailable by computer, due to downtime. He was admitted with acute cholecystitis.

## 2018-11-11 NOTE — SUBJECTIVE & OBJECTIVE
Past Medical History:   Diagnosis Date    Abnormal ECG 10/27/2015    Anxiety     CAD, multiple vessel 10/27/2015    Carotid artery occlusion     Carotid artery stenosis 6/4/2013    Coronary artery disease     Dementia     Vacular & Frontal lobe    History of PTCA 10/27/2015    Hyperlipidemia     Hypertension     Myocardial infarction     Prostate cancer     Sleep apnea     Stroke     unsure if pt has residual weakness    Subdural hematoma, post-traumatic        Past Surgical History:   Procedure Laterality Date    BRAIN SURGERY      CARDIAC SURGERY      CORONARY ARTERY BYPASS GRAFT  2001    CRANIOTOMY      Ext carotid to Int carotid bypass      HEMORRHOID SURGERY      PROSTATE SURGERY  2001    Radical prostatectomy for prostate cancer    URETHROTOMY-DIRECT VISUAL INTERNAL (DVIU) N/A 11/5/2015    Performed by Agusto Arroyo IV, MD at [facility] OR       Review of patient's allergies indicates:   Allergen Reactions    Lipitor [atorvastatin]      Other reaction(s): Muscle pain       No current facility-administered medications on file prior to encounter.      Current Outpatient Medications on File Prior to Encounter   Medication Sig    ALPRAZolam (XANAX) 1 MG tablet TAKE ONE TABLET BY MOUTH IN THE MORNING THEN ON TABLET AT NOON THEN TWO TABLETS AT BEDTIME    aspirin 81 mg Tab Take 81 mg by mouth Daily. 1 Tablet Oral Every day    bisacodyl (DULCOLAX) 5 mg EC tablet Take 5 mg by mouth daily as needed for Constipation.    buPROPion (WELLBUTRIN XL) 150 MG TB24 tablet Take 1 tablet (150 mg total) by mouth once daily.    cetirizine (ZYRTEC) 10 MG tablet Take 10 mg by mouth once daily.    cholecalciferol, vitamin D3, (VITAMIN D3) 2,000 unit Cap Take 1 capsule by mouth once daily.    citalopram (CELEXA) 40 MG tablet TAKE ONE TABLET BY MOUTH ONCE DAILY    docusate sodium (COLACE) 100 MG capsule Take 1 capsule (100 mg total) by mouth 2 (two) times daily.    linaclotide (LINZESS) 145 mcg Cap capsule  Take 1 capsule (145 mcg total) by mouth once daily.    melatonin 10 mg Tab Take 1 tablet by mouth every evening. Pt takes 10mg and 5mg sometimes     midodrine (PROAMATINE) 10 MG tablet TAKE ONE TABLET BY MOUTH TWICE DAILY    nitroGLYCERIN (NITROSTAT) 0.4 MG SL tablet Place 1 tablet (0.4 mg total) under the tongue every 5 (five) minutes as needed for Chest pain. 1 Tablet, Sublingual Sublingual As directed.    omeprazole (PRILOSEC) 20 MG capsule Take 1 capsule (20 mg total) by mouth once daily.    ondansetron (ZOFRAN-ODT) 4 MG TbDL Take 1 tablet (4 mg total) by mouth every 8 (eight) hours as needed (nausea).    polyethylene glycol (GLYCOLAX) 17 gram/dose powder Take 17 g by mouth once daily.    QUEtiapine (SEROQUEL) 25 MG Tab Take 1 tab in the AM and 1 in the PM    rivastigmine tartrate (EXELON) 6 mg capsule TAKE ONE CAPSULE BY MOUTH TWICE DAILY    simvastatin (ZOCOR) 40 MG tablet TAKE ONE TABLET BY MOUTH IN THE EVENING    tamsulosin (FLOMAX) 0.4 mg Cp24 Take 1 capsule (0.4 mg total) by mouth once daily. (Patient taking differently: Take 0.4 mg by mouth as needed. )     Family History     Problem Relation (Age of Onset)    Cancer Father    Heart disease Mother, Brother        Tobacco Use    Smoking status: Former Smoker     Packs/day: 2.00     Years: 20.00     Pack years: 40.00     Types: Cigarettes     Last attempt to quit: 1995     Years since quittin.4    Smokeless tobacco: Never Used   Substance and Sexual Activity    Alcohol use: No     Alcohol/week: 0.0 oz     Comment: Rare beer    Drug use: No    Sexual activity: Yes     Partners: Female     Review of Systems   Constitutional: Positive for activity change. Negative for appetite change, chills, diaphoresis, fatigue, fever and unexpected weight change.        Total care by wife.    HENT: Negative for congestion, ear discharge, ear pain, facial swelling, hearing loss, postnasal drip, sore throat, tinnitus, trouble swallowing and voice  change.    Eyes: Negative for photophobia, pain, discharge, redness, itching and visual disturbance.   Respiratory: Negative for cough, choking, chest tightness, shortness of breath, wheezing and stridor.    Cardiovascular: Negative for chest pain, palpitations and leg swelling.   Gastrointestinal: Positive for constipation. Negative for abdominal distention, abdominal pain, blood in stool, diarrhea, nausea and vomiting.        Incontinent   Endocrine: Negative for cold intolerance, heat intolerance, polydipsia, polyphagia and polyuria.   Genitourinary: Negative for difficulty urinating, flank pain, frequency, hematuria and urgency.        Incontinent   Musculoskeletal: Negative for arthralgias, back pain, gait problem and myalgias.   Skin: Negative for color change, pallor, rash and wound.   Neurological: Positive for tremors (pill rolling), speech difficulty (slow) and weakness. Negative for dizziness, seizures, syncope, facial asymmetry, light-headedness, numbness and headaches.        Stares, shiny face   Hematological: Negative for adenopathy. Does not bruise/bleed easily.   Psychiatric/Behavioral: Negative for agitation, confusion, hallucinations and suicidal ideas. The patient is not nervous/anxious.    All other systems reviewed and are negative.    Objective:     Vital Signs (Most Recent):  Temp: 98.5 °F (36.9 °C) (11/11/18 1656)  Pulse: 88 (11/11/18 1656)  Resp: 16 (11/11/18 1656)  BP: (!) 155/72 (11/11/18 1656)  SpO2: 95 % (11/11/18 1656) Vital Signs (24h Range):  Temp:  [98.5 °F (36.9 °C)-100.2 °F (37.9 °C)] 98.5 °F (36.9 °C)  Pulse:  [83-91] 88  Resp:  [16-20] 16  SpO2:  [93 %-96 %] 95 %  BP: (117-181)/(65-83) 155/72     Weight: 72.2 kg (159 lb 2.8 oz)  Body mass index is 21.59 kg/m².    Physical Exam   Constitutional: He is oriented to person, place, and time. He appears well-developed and well-nourished.   Tatal care   HENT:   Head: Normocephalic and atraumatic.   Nose: Nose normal.   Eyes:  Conjunctivae and EOM are normal. Pupils are equal, round, and reactive to light.   Neck: Normal range of motion. Neck supple. No JVD present. No tracheal deviation present. No thyromegaly present.   Cardiovascular: Normal rate, regular rhythm, normal heart sounds and intact distal pulses. Exam reveals no gallop and no friction rub.   No murmur heard.  Pulmonary/Chest: Effort normal. No stridor. No respiratory distress. He has no wheezes. He has no rales. He exhibits no tenderness.   Abdominal: Soft. Bowel sounds are normal. He exhibits no distension. There is tenderness (Positive joel sign). There is guarding. There is no rebound.   Musculoskeletal: He exhibits no edema, tenderness or deformity.   Weakness   Neurological: He is alert and oriented to person, place, and time. He has normal reflexes. No cranial nerve deficit. Coordination normal.   Skin: Skin is warm and dry. No rash noted. No erythema. No pallor.   Psychiatric: He has a normal mood and affect. His behavior is normal. Judgment and thought content normal.   Nursing note and vitals reviewed.        CRANIAL NERVES     CN III, IV, VI   Pupils are equal, round, and reactive to light.  Extraocular motions are normal.      Significant Labs: CBC: No results for input(s): WBC, HGB, HCT, PLT in the last 48 hours.  CMP: No results for input(s): NA, K, CL, CO2, GLU, BUN, CREATININE, CALCIUM, PROT, ALBUMIN, BILITOT, ALKPHOS, AST, ALT, ANIONGAP, EGFRNONAA in the last 48 hours.    Invalid input(s): ESTGFAFRICA    Significant Imaging: I have reviewed all pertinent imaging results/findings within the past 24 hours.   Imaging Results          US Abdomen Limited (Final result)  Result time 11/11/18 15:41:11    Final result by Carlitos Pimentel MD (11/11/18 15:41:11)                 Impression:      The gallbladder is abnormal.  It is distended, thick-walled and contains multiple shadowing stones with a pattern suggesting changes of cholecystitis with positive Joel  sign..      Electronically signed by: Wood Rdoas MD  Date:    11/11/2018  Time:    15:41             Narrative:    EXAMINATION:  US ABDOMEN LIMITED    CLINICAL HISTORY:  RUQ Pain;    TECHNIQUE:  Limited ultrasound of the right upper quadrant of the abdomen (including pancreas, liver, gallbladder, common bile duct, and right kidney) was performed.    COMPARISON:  November 8, 2018    FINDINGS:  Liver: Normal in size. The liver demonstrates homogenous echotexture. no focal hepatic lesions are seen.    Biliary system: The gallbladder is distended.  It measures 11.9 cm in length.  There is diffuse gallbladder wall thickening measuring up to 0.96 cm in diameter with multiple shadowing stones.  Some pericholecystic edema is present.  Joel sign is positive.  The common duct is not dilated, measuring 0.35 cm. No intrahepatic ductal dilatation.    Pancreas: The visualized portions of pancreas appear normal    Right kidney: Normal in size measuring 9.8 cmwith no hydronephrosis, mass, or calculi.    Vascular: The portions of the aorta, vena cava, and portal vein appear free of acute abnormality.                               X-Ray Ribs 2 View Right (Final result)  Result time 11/11/18 14:09:33    Final result by Carlitos Rodas MD (11/11/18 14:09:33)                 Impression:      No acute findings.      Electronically signed by: Wood Rodas MD  Date:    11/11/2018  Time:    14:09             Narrative:    EXAMINATION:  XR RIBS 2 VIEW RIGHT    CLINICAL HISTORY:  Pleurodynia    TECHNIQUE:  Two views of the right ribs were performed.    COMPARISON:  November 8, 2018    FINDINGS:  The right ribs appear intact with no fracture demonstrated.  The right lung appears clear and well expanded.

## 2018-11-11 NOTE — HOSPITAL COURSE
CT scan evaluation as well as an ultrasound evaluation completed today in the emergency department confirms presence of multiple gallstones within acute cholecystitis.  Gallbladder wall is thickened with pericholecystic fluid.

## 2018-11-11 NOTE — PLAN OF CARE
Problem: Patient Care Overview  Goal: Plan of Care Review  Outcome: Ongoing (interventions implemented as appropriate)  Received patient from ER. Vital signs stable. Patient denies pain. Fall precautions maintained, patient remains free from injury. Cardiac monitor applied. Fluids started. Patient resting in bed with wife at bedside. Will continue to monitor.

## 2018-11-11 NOTE — ED PROVIDER NOTES
SCRIBE #1 NOTE: I, Nneka Madsen, am scribing for, and in the presence of, Beba Magana MD. I have scribed the entire note.       History     Chief Complaint   Patient presents with    Abdominal Pain     pt reports pain to right side/flank radiating up x 1 week with fever; pt was seen here last week and had work up but denies any improvement      Review of patient's allergies indicates:   Allergen Reactions    Lipitor [atorvastatin]      Other reaction(s): Muscle pain         History of Present Illness     HPI    11/11/2018, 1:06 PM  History obtained from the wife       History of Present Illness: Tico Reeves is a 73 y.o. male patient with a PMHx of Parkinson's, CAD, HTN, and dementia who presents to the Emergency Department for evaluation of RUQ abdominal pain which onset gradually about a week ago. Symptoms are intermittent and moderate in severity. No mitigating or exacerbating factors reported. Pain is not exacerbated with eating. Associated sxs include nausea. Patient denies any vomiting, diarrhea, lightheadedness, headache, SOB, CP, and all other sxs at this time. Pt's wife states the pt fell 4 days ago and says he hit his head. Pt was seen in the ED on 11/8 for evaluation of elevated white count and abdominal pain. Pt was dx with constipation and calculus of gallbladder without cholecystitis. No further complaints or concerns at this time.         Arrival mode: Personal vehicle     PCP: Gonzales Shelton MD        Past Medical History:  Past Medical History:   Diagnosis Date    Abnormal ECG 10/27/2015    Anxiety     CAD, multiple vessel 10/27/2015    Carotid artery occlusion     Carotid artery stenosis 6/4/2013    Coronary artery disease     Dementia     Vacular & Frontal lobe    History of PTCA 10/27/2015    Hyperlipidemia     Hypertension     Myocardial infarction     Prostate cancer     Sleep apnea     Stroke     unsure if pt has residual weakness    Subdural hematoma,  post-traumatic        Past Surgical History:  Past Surgical History:   Procedure Laterality Date    BRAIN SURGERY      CARDIAC SURGERY      CORONARY ARTERY BYPASS GRAFT      CRANIOTOMY      Ext carotid to Int carotid bypass      HEMORRHOID SURGERY      PROSTATE SURGERY      Radical prostatectomy for prostate cancer    ROBOT-ASSISTED CHOLECYSTECTOMY USING DA ARELIS XI N/A 2018    Procedure: XI ROBOTIC CHOLECYSTECTOMY;  Surgeon: Wojciech Leblanc MD;  Location: [facility] OR;  Service: General;  Laterality: N/A;    URETHROTOMY-DIRECT VISUAL INTERNAL (DVIU) N/A 2015    Performed by Agusto Arroyo IV, MD at [facility] OR    XI ROBOTIC CHOLECYSTECTOMY N/A 2018    Performed by Wojciech Leblanc MD at [facility] OR         Family History:  Family History   Problem Relation Age of Onset    Heart disease Mother     Cancer Father     Heart disease Brother        Social History:  Social History     Tobacco Use    Smoking status: Former Smoker     Packs/day: 2.00     Years: 20.00     Pack years: 40.00     Types: Cigarettes     Last attempt to quit: 1995     Years since quittin.4    Smokeless tobacco: Never Used   Substance and Sexual Activity    Alcohol use: No     Alcohol/week: 0.0 oz     Comment: Rare beer    Drug use: No    Sexual activity: Yes     Partners: Female        Review of Systems     Review of Systems   Constitutional: Negative for chills and fever.   HENT: Negative for congestion and sore throat.    Eyes: Negative for pain.   Respiratory: Negative for cough and shortness of breath.    Cardiovascular: Negative for chest pain.   Gastrointestinal: Positive for abdominal pain (RUQ) and nausea. Negative for blood in stool, diarrhea and vomiting.   Genitourinary: Negative for dysuria and hematuria.   Musculoskeletal: Negative for back pain and neck pain.   Skin: Negative for rash.   Neurological: Negative for weakness, numbness and headaches.   All other systems reviewed and are negative.        Physical Exam     Initial Vitals [11/11/18 1238]   BP Pulse Resp Temp SpO2   117/65 83 20 100.2 °F (37.9 °C) (!) 93 %      MAP       --          Physical Exam  Nursing Notes and Vital Signs Reviewed.  Constitutional: Patient is in no apparent distress. Well-developed and well-nourished. Elderly. Demented. Baseline tremor. Not acutely-ill appearing.   Head: Atraumatic. Normocephalic.  Eyes: PERRL. EOM intact. Conjunctivae are not pale. No scleral icterus.  ENT: Mucous membranes are moist. Oropharynx is clear and symmetric.    Neck: Supple. Full ROM. No lymphadenopathy.  Cardiovascular: Regular rate. Regular rhythm. No murmurs, rubs, or gallops. Distal pulses are 2+ and symmetric.  Pulmonary/Chest: No respiratory distress. Clear to auscultation bilaterally. No wheezing or rales.  Abdominal: Soft and non-distended.  There is no tenderness.  No rebound, guarding, or rigidity. Good bowel sounds.  Genitourinary: No CVA tenderness  Musculoskeletal: Moves all extremities. No obvious deformities. No edema. No calf tenderness.  Neck: Supple. No cervical midline bony tenderness, deformities, or step-offs.   Back: No midline bony tenderness, deformities, or step-offs of the T-spine or L-spine. Skin appears normal without abrasions or bruising. No erythema, induration, or fluctuance.   Skin: Warm and dry.  Neurological:  Alert, awake, and appropriate.  Normal speech.  No acute focal neurological deficits are appreciated.  Psychiatric: Normal affect. Good eye contact. Appropriate in content.       ED Course   Procedures  ED Vital Signs:  Vitals:    11/12/18 1330 11/12/18 1500 11/12/18 1634 11/12/18 1941   BP: (!) 150/72  (!) 181/85    Pulse: 84  90 72   Resp: 17  20 18   Temp:   98.5 °F (36.9 °C)    TempSrc:   Oral    SpO2: 96% 96% 95% 95%   Weight:       Height:        11/12/18 2001 11/12/18 2111 11/12/18 2116 11/12/18 2304   BP: (!) 182/79  (!) 163/75    Pulse: 94 87 88 81   Resp: 18      Temp: 99.3 °F (37.4 °C)      TempSrc:  Oral      SpO2: 97%      Weight:       Height:        11/12/18 2318 11/13/18 0120 11/13/18 0339 11/13/18 0414   BP: (!) 164/78   (!) 142/70   Pulse: 86 89 70 79   Resp: 18   18   Temp: 98.2 °F (36.8 °C)   98 °F (36.7 °C)   TempSrc: Oral   Oral   SpO2: (!) 92%   (!) 94%   Weight:       Height:        11/13/18 0505 11/13/18 0733 11/13/18 0741   BP:   (!) 163/72   Pulse: 84  78   Resp:   16   Temp:   97.7 °F (36.5 °C)   TempSrc:   Oral   SpO2:  96% 95%   Weight:      Height:          Abnormal Lab Results:  Labs Reviewed   URINALYSIS, REFLEX TO URINE CULTURE    Narrative:     Preferred Collection Type->Urine, Clean Catch        All Lab Results:  WBC 14.98  HGB 12.7  HCT 36.6      All other CBC results normal.     AlkP 154      Bili 2.2  CO2C 23  CreaC 0.83  GluC 127  Na-C 135    All other CMP results normal.    Lactic acid 1.1    PCT 0.13      Imaging Results:  Imaging Results          US Abdomen Limited (Final result)  Result time 11/11/18 15:41:11    Final result by Carlitos Rodas MD (11/11/18 15:41:11)                 Impression:      The gallbladder is abnormal.  It is distended, thick-walled and contains multiple shadowing stones with a pattern suggesting changes of cholecystitis with positive Joel sign..      Electronically signed by: Wood Rodas MD  Date:    11/11/2018  Time:    15:41             Narrative:    EXAMINATION:  US ABDOMEN LIMITED    CLINICAL HISTORY:  RUQ Pain;    TECHNIQUE:  Limited ultrasound of the right upper quadrant of the abdomen (including pancreas, liver, gallbladder, common bile duct, and right kidney) was performed.    COMPARISON:  November 8, 2018    FINDINGS:  Liver: Normal in size. The liver demonstrates homogenous echotexture. no focal hepatic lesions are seen.    Biliary system: The gallbladder is distended.  It measures 11.9 cm in length.  There is diffuse gallbladder wall thickening measuring up to 0.96 cm in diameter with multiple shadowing stones.   Some pericholecystic edema is present.  Joel sign is positive.  The common duct is not dilated, measuring 0.35 cm. No intrahepatic ductal dilatation.    Pancreas: The visualized portions of pancreas appear normal    Right kidney: Normal in size measuring 9.8 cmwith no hydronephrosis, mass, or calculi.    Vascular: The portions of the aorta, vena cava, and portal vein appear free of acute abnormality.                               X-Ray Ribs 2 View Right (Final result)  Result time 11/11/18 14:09:33    Final result by Carlitos Rodas MD (11/11/18 14:09:33)                 Impression:      No acute findings.      Electronically signed by: Wood Rodas MD  Date:    11/11/2018  Time:    14:09             Narrative:    EXAMINATION:  XR RIBS 2 VIEW RIGHT    CLINICAL HISTORY:  Pleurodynia    TECHNIQUE:  Two views of the right ribs were performed.    COMPARISON:  November 8, 2018    FINDINGS:  The right ribs appear intact with no fracture demonstrated.  The right lung appears clear and well expanded.                                 The EKG was ordered, reviewed, and independently interpreted by the ED provider.  The EKG was ordered, reviewed, and independently interpreted by the ED provider.  Interpretation time: 16:15  Rate: 88 BPM  Rhythm: normal sinus rhythm  Interpretation: Possible left atrial enlargement. Nonspecific ST abnormality. Prolonged QT. No STEMI.             The Emergency Provider reviewed the vital signs and test results, which are outlined above.     ED Discussion       4:01 PM: Discussed pt's case with Dr. Leblanc (General Surgery) who recommends admission to Hospital Medicine. He will see the pt.      3:55 PM: Discussed case with Raquel Coats NP (Hospital Medicine) agrees with current care and management of pt and accepts admission.   Admitting Service: Hospital medicine   Admitting Physician: Dr. Diaz  Admit to: Med Tele     3:56 PM: Re-evaluated pt. I have discussed test results, shared treatment  plan, and the need for admission with patient and family at bedside. Pt and family express understanding at this time and agree with all information. All questions answered. Pt and family have no further questions or concerns at this time. Pt is ready for admit.        ED Medication(s):  Medications   sodium chloride 0.9% bolus 1,000 mL (0 mLs Intravenous Stopped 11/11/18 1633)   0.9%  NaCl infusion (1,000 mLs Intravenous New Bag 11/11/18 1633)   lorazepam (ATIVAN) injection 1 mg (1 mg Intravenous Given 11/11/18 1633)   cefazolin (ANCEF) 2 gram in dextrose 5% 50 mL IVPB (premix) (2 g Intravenous Given 11/12/18 1058)   indocyanine green injection 2.5 mg (2.5 mg Intravenous Given 11/12/18 1048)       Follow-up Information     Gonzales Shelton MD. Schedule an appointment as soon as possible for a visit in 3 days.    Specialty:  Internal Medicine  Contact information:  [id number] [facility]  [address]  [place] [address] [phone]             Elsa Bryant PA-C. Schedule an appointment as soon as possible for a visit on 11/20/2018.    Specialty:  General Surgery  Why:  post op, drain  Contact information:  [address] [facility] [address] [place] [address] [phone]                         Medical Decision Making:   Clinical Tests:   Lab Tests: Ordered and Reviewed  Radiological Study: Ordered and Reviewed  Medical Tests: Ordered and Reviewed             Scribe Attestation:   Scribe #1: I performed the above scribed service and the documentation accurately describes the services I performed. I attest to the accuracy of the note.     Attending:   Physician Attestation Statement for Scribe #1: I, Beba Magana MD, personally performed the services described in this documentation, as scribed by Nneka Madsen, in my presence, and it is both accurate and complete.           Clinical Impression       ICD-10-CM ICD-9-CM   1. Calculus of gallbladder with acute cholecystitis without obstruction K80.00 574.00   2. Rib  pain R07.81 786.50   3. History of Parkinson's disease Z86.69 V12.49   4. Essential hypertension I10 401.9   5. HTN (hypertension) I10 401.9       Disposition:   Disposition: Admitted  Condition: Flo Magana MD  11/15/18 2353

## 2018-11-11 NOTE — ASSESSMENT & PLAN NOTE
Acute cholecystitis due to cholelithiasis.  The plan is to proceed with robotic cholecystectomy.  The surgery will be scheduled for November 12, 2018 at 2:00 p.m..  The risk and the benefit of the procedure were fully addressed with the patient and his spouse including open cholecystectomy.  They have agreed to proceed as planned.

## 2018-11-11 NOTE — HPI
Tico is 73-year-old white male patient was being seen at the request of emergency room physician for the evaluation of known right upper quadrant abdominal pain associated with fever.  The patient was seen in the emergency department  3 days ago for the evaluation of abdominal pain. He had CT scan of the abdomen which showed no definitive findings suggestive of acute cholecystitis and was discharged home.  He returned to the emergency department today after recurrence of severe right upper quadrant pain associated with nausea.  He has Parkinson's disease with dementia as well as known coronary arterial disease as his past medical history.  He is verbal and able to carry on a conversation.  His wife is at his bedside and explaining recent events.  He denies any abdominal surgery.

## 2018-11-12 ENCOUNTER — ANESTHESIA EVENT (OUTPATIENT)
Dept: SURGERY | Facility: HOSPITAL | Age: 73
DRG: 419 | End: 2018-11-12
Payer: MEDICARE

## 2018-11-12 ENCOUNTER — ANESTHESIA (OUTPATIENT)
Dept: SURGERY | Facility: HOSPITAL | Age: 73
DRG: 419 | End: 2018-11-12
Payer: MEDICARE

## 2018-11-12 LAB
ALBUMIN SERPL BCP-MCNC: 3 G/DL
ALP SERPL-CCNC: 174 U/L
ALT SERPL W/O P-5'-P-CCNC: 199 U/L
ANION GAP SERPL CALC-SCNC: 10 MMOL/L
AST SERPL-CCNC: 186 U/L
BASOPHILS # BLD AUTO: 0.03 K/UL
BASOPHILS NFR BLD: 0.4 %
BILIRUB SERPL-MCNC: 3.1 MG/DL
BUN SERPL-MCNC: 12 MG/DL
CALCIUM SERPL-MCNC: 8.9 MG/DL
CHLORIDE SERPL-SCNC: 105 MMOL/L
CO2 SERPL-SCNC: 23 MMOL/L
CREAT SERPL-MCNC: 0.8 MG/DL
DIFFERENTIAL METHOD: ABNORMAL
EOSINOPHIL # BLD AUTO: 0.2 K/UL
EOSINOPHIL NFR BLD: 2.8 %
ERYTHROCYTE [DISTWIDTH] IN BLOOD BY AUTOMATED COUNT: 12.4 %
EST. GFR  (AFRICAN AMERICAN): >60 ML/MIN/1.73 M^2
EST. GFR  (NON AFRICAN AMERICAN): >60 ML/MIN/1.73 M^2
GLUCOSE SERPL-MCNC: 88 MG/DL
HCT VFR BLD AUTO: 37.1 %
HGB BLD-MCNC: 12.7 G/DL
LYMPHOCYTES # BLD AUTO: 1.5 K/UL
LYMPHOCYTES NFR BLD: 18.3 %
MCH RBC QN AUTO: 32.8 PG
MCHC RBC AUTO-ENTMCNC: 34.2 G/DL
MCV RBC AUTO: 96 FL
MONOCYTES # BLD AUTO: 0.9 K/UL
MONOCYTES NFR BLD: 10.5 %
NEUTROPHILS # BLD AUTO: 5.5 K/UL
NEUTROPHILS NFR BLD: 68 %
PLATELET # BLD AUTO: 215 K/UL
PMV BLD AUTO: 9.6 FL
POTASSIUM SERPL-SCNC: 4 MMOL/L
PROT SERPL-MCNC: 7 G/DL
RBC # BLD AUTO: 3.87 M/UL
SODIUM SERPL-SCNC: 138 MMOL/L
WBC # BLD AUTO: 8.13 K/UL

## 2018-11-12 PROCEDURE — 8E0W4CZ ROBOTIC ASSISTED PROCEDURE OF TRUNK REGION, PERCUTANEOUS ENDOSCOPIC APPROACH: ICD-10-PCS | Performed by: SURGERY

## 2018-11-12 PROCEDURE — 85025 COMPLETE CBC W/AUTO DIFF WBC: CPT | Mod: HCNC

## 2018-11-12 PROCEDURE — 21400001 HC TELEMETRY ROOM: Mod: HCNC

## 2018-11-12 PROCEDURE — 47563 LAPARO CHOLECYSTECTOMY/GRAPH: CPT | Mod: HCNC,,, | Performed by: SURGERY

## 2018-11-12 PROCEDURE — S0020 INJECTION, BUPIVICAINE HYDRO: HCPCS | Mod: HCNC | Performed by: SURGERY

## 2018-11-12 PROCEDURE — 36000711: Mod: HCNC | Performed by: SURGERY

## 2018-11-12 PROCEDURE — 94799 UNLISTED PULMONARY SVC/PX: CPT | Mod: HCNC

## 2018-11-12 PROCEDURE — 63600175 PHARM REV CODE 636 W HCPCS: Mod: HCNC | Performed by: NURSE ANESTHETIST, CERTIFIED REGISTERED

## 2018-11-12 PROCEDURE — 63600175 PHARM REV CODE 636 W HCPCS: Mod: HCNC | Performed by: NURSE PRACTITIONER

## 2018-11-12 PROCEDURE — 63600175 PHARM REV CODE 636 W HCPCS: Mod: HCNC | Performed by: SURGERY

## 2018-11-12 PROCEDURE — 25000003 PHARM REV CODE 250: Mod: HCNC | Performed by: SURGERY

## 2018-11-12 PROCEDURE — 27201423 OPTIME MED/SURG SUP & DEVICES STERILE SUPPLY: Mod: HCNC | Performed by: SURGERY

## 2018-11-12 PROCEDURE — 80053 COMPREHEN METABOLIC PANEL: CPT | Mod: HCNC

## 2018-11-12 PROCEDURE — 25000003 PHARM REV CODE 250: Mod: HCNC | Performed by: NURSE ANESTHETIST, CERTIFIED REGISTERED

## 2018-11-12 PROCEDURE — 88304 TISSUE EXAM BY PATHOLOGIST: CPT | Mod: 26,HCNC,, | Performed by: PATHOLOGY

## 2018-11-12 PROCEDURE — 37000008 HC ANESTHESIA 1ST 15 MINUTES: Mod: HCNC | Performed by: SURGERY

## 2018-11-12 PROCEDURE — C1729 CATH, DRAINAGE: HCPCS | Mod: HCNC | Performed by: SURGERY

## 2018-11-12 PROCEDURE — 0FT44ZZ RESECTION OF GALLBLADDER, PERCUTANEOUS ENDOSCOPIC APPROACH: ICD-10-PCS | Performed by: SURGERY

## 2018-11-12 PROCEDURE — 36415 COLL VENOUS BLD VENIPUNCTURE: CPT | Mod: HCNC

## 2018-11-12 PROCEDURE — 88304 TISSUE EXAM BY PATHOLOGIST: CPT | Mod: HCNC | Performed by: PATHOLOGY

## 2018-11-12 PROCEDURE — 63600175 PHARM REV CODE 636 W HCPCS: Mod: HCNC | Performed by: EMERGENCY MEDICINE

## 2018-11-12 PROCEDURE — 37000009 HC ANESTHESIA EA ADD 15 MINS: Mod: HCNC | Performed by: SURGERY

## 2018-11-12 PROCEDURE — 94760 N-INVAS EAR/PLS OXIMETRY 1: CPT | Mod: HCNC

## 2018-11-12 PROCEDURE — 25000003 PHARM REV CODE 250: Mod: HCNC | Performed by: EMERGENCY MEDICINE

## 2018-11-12 PROCEDURE — 36000710: Mod: HCNC | Performed by: SURGERY

## 2018-11-12 PROCEDURE — 71000033 HC RECOVERY, INTIAL HOUR: Mod: HCNC | Performed by: SURGERY

## 2018-11-12 PROCEDURE — 11000001 HC ACUTE MED/SURG PRIVATE ROOM: Mod: HCNC

## 2018-11-12 PROCEDURE — 99900031 HC PATIENT EDUCATION (STAT): Mod: HCNC

## 2018-11-12 PROCEDURE — 25000003 PHARM REV CODE 250: Mod: HCNC | Performed by: NURSE PRACTITIONER

## 2018-11-12 RX ORDER — CHLORHEXIDINE GLUCONATE ORAL RINSE 1.2 MG/ML
10 SOLUTION DENTAL 2 TIMES DAILY
Status: DISCONTINUED | OUTPATIENT
Start: 2018-11-12 | End: 2018-11-13 | Stop reason: HOSPADM

## 2018-11-12 RX ORDER — OXYCODONE HYDROCHLORIDE 5 MG/1
5 TABLET ORAL
Status: DISCONTINUED | OUTPATIENT
Start: 2018-11-12 | End: 2018-11-12 | Stop reason: HOSPADM

## 2018-11-12 RX ORDER — BUPIVACAINE HYDROCHLORIDE 5 MG/ML
INJECTION, SOLUTION EPIDURAL; INTRACAUDAL
Status: DISCONTINUED | OUTPATIENT
Start: 2018-11-12 | End: 2018-11-12 | Stop reason: HOSPADM

## 2018-11-12 RX ORDER — HYDROMORPHONE HYDROCHLORIDE 1 MG/ML
1 INJECTION, SOLUTION INTRAMUSCULAR; INTRAVENOUS; SUBCUTANEOUS
Status: DISCONTINUED | OUTPATIENT
Start: 2018-11-12 | End: 2018-11-13 | Stop reason: HOSPADM

## 2018-11-12 RX ORDER — SODIUM CHLORIDE, SODIUM LACTATE, POTASSIUM CHLORIDE, CALCIUM CHLORIDE 600; 310; 30; 20 MG/100ML; MG/100ML; MG/100ML; MG/100ML
INJECTION, SOLUTION INTRAVENOUS CONTINUOUS PRN
Status: DISCONTINUED | OUTPATIENT
Start: 2018-11-12 | End: 2018-11-12

## 2018-11-12 RX ORDER — SODIUM CHLORIDE, SODIUM LACTATE, POTASSIUM CHLORIDE, CALCIUM CHLORIDE 600; 310; 30; 20 MG/100ML; MG/100ML; MG/100ML; MG/100ML
INJECTION, SOLUTION INTRAVENOUS CONTINUOUS
Status: DISCONTINUED | OUTPATIENT
Start: 2018-11-12 | End: 2018-11-13 | Stop reason: HOSPADM

## 2018-11-12 RX ORDER — MIDODRINE HYDROCHLORIDE 5 MG/1
10 TABLET ORAL 2 TIMES DAILY
Status: DISCONTINUED | OUTPATIENT
Start: 2018-11-12 | End: 2018-11-13 | Stop reason: HOSPADM

## 2018-11-12 RX ORDER — SUCCINYLCHOLINE CHLORIDE 20 MG/ML
INJECTION INTRAMUSCULAR; INTRAVENOUS
Status: DISCONTINUED | OUTPATIENT
Start: 2018-11-12 | End: 2018-11-12

## 2018-11-12 RX ORDER — DEXAMETHASONE SODIUM PHOSPHATE 4 MG/ML
INJECTION, SOLUTION INTRA-ARTICULAR; INTRALESIONAL; INTRAMUSCULAR; INTRAVENOUS; SOFT TISSUE
Status: DISCONTINUED | OUTPATIENT
Start: 2018-11-12 | End: 2018-11-12

## 2018-11-12 RX ORDER — PHENYLEPHRINE HYDROCHLORIDE 10 MG/ML
INJECTION INTRAVENOUS
Status: DISCONTINUED | OUTPATIENT
Start: 2018-11-12 | End: 2018-11-12

## 2018-11-12 RX ORDER — MEPERIDINE HYDROCHLORIDE 50 MG/ML
12.5 INJECTION INTRAMUSCULAR; INTRAVENOUS; SUBCUTANEOUS ONCE AS NEEDED
Status: DISCONTINUED | OUTPATIENT
Start: 2018-11-12 | End: 2018-11-12 | Stop reason: HOSPADM

## 2018-11-12 RX ORDER — RAMELTEON 8 MG/1
8 TABLET ORAL NIGHTLY PRN
Status: DISCONTINUED | OUTPATIENT
Start: 2018-11-12 | End: 2018-11-13 | Stop reason: HOSPADM

## 2018-11-12 RX ORDER — ROCURONIUM BROMIDE 10 MG/ML
INJECTION, SOLUTION INTRAVENOUS
Status: DISCONTINUED | OUTPATIENT
Start: 2018-11-12 | End: 2018-11-12

## 2018-11-12 RX ORDER — SODIUM CHLORIDE 0.9 % (FLUSH) 0.9 %
3 SYRINGE (ML) INJECTION EVERY 8 HOURS
Status: DISCONTINUED | OUTPATIENT
Start: 2018-11-12 | End: 2018-11-12 | Stop reason: HOSPADM

## 2018-11-12 RX ORDER — AMOXICILLIN 250 MG
1 CAPSULE ORAL 2 TIMES DAILY
Status: DISCONTINUED | OUTPATIENT
Start: 2018-11-12 | End: 2018-11-13 | Stop reason: HOSPADM

## 2018-11-12 RX ORDER — CLONIDINE HYDROCHLORIDE 0.1 MG/1
0.1 TABLET ORAL EVERY 6 HOURS PRN
Status: DISCONTINUED | OUTPATIENT
Start: 2018-11-12 | End: 2018-11-13 | Stop reason: HOSPADM

## 2018-11-12 RX ORDER — HYDROCODONE BITARTRATE AND ACETAMINOPHEN 5; 325 MG/1; MG/1
1 TABLET ORAL EVERY 4 HOURS PRN
Status: DISCONTINUED | OUTPATIENT
Start: 2018-11-12 | End: 2018-11-13 | Stop reason: HOSPADM

## 2018-11-12 RX ORDER — SODIUM CHLORIDE 9 MG/ML
INJECTION, SOLUTION INTRAVENOUS CONTINUOUS
Status: DISCONTINUED | OUTPATIENT
Start: 2018-11-12 | End: 2018-11-12

## 2018-11-12 RX ORDER — BISACODYL 10 MG
10 SUPPOSITORY, RECTAL RECTAL DAILY PRN
Status: DISCONTINUED | OUTPATIENT
Start: 2018-11-12 | End: 2018-11-13 | Stop reason: HOSPADM

## 2018-11-12 RX ORDER — SODIUM CHLORIDE 0.9 % (FLUSH) 0.9 %
3 SYRINGE (ML) INJECTION
Status: DISCONTINUED | OUTPATIENT
Start: 2018-11-12 | End: 2018-11-12

## 2018-11-12 RX ORDER — LACTULOSE 10 G/15ML
20 SOLUTION ORAL EVERY 6 HOURS PRN
Status: DISCONTINUED | OUTPATIENT
Start: 2018-11-12 | End: 2018-11-13 | Stop reason: HOSPADM

## 2018-11-12 RX ORDER — ACETAMINOPHEN 325 MG/1
650 TABLET ORAL EVERY 6 HOURS PRN
Status: DISCONTINUED | OUTPATIENT
Start: 2018-11-12 | End: 2018-11-13 | Stop reason: HOSPADM

## 2018-11-12 RX ORDER — INDOCYANINE GREEN AND WATER 25 MG
2.5 KIT INJECTION ONCE
Status: COMPLETED | OUTPATIENT
Start: 2018-11-12 | End: 2018-11-12

## 2018-11-12 RX ORDER — TAMSULOSIN HYDROCHLORIDE 0.4 MG/1
0.4 CAPSULE ORAL DAILY
Status: DISCONTINUED | OUTPATIENT
Start: 2018-11-12 | End: 2018-11-13 | Stop reason: HOSPADM

## 2018-11-12 RX ORDER — GLYCOPYRROLATE 0.2 MG/ML
INJECTION INTRAMUSCULAR; INTRAVENOUS
Status: DISCONTINUED | OUTPATIENT
Start: 2018-11-12 | End: 2018-11-12

## 2018-11-12 RX ORDER — NEOSTIGMINE METHYLSULFATE 1 MG/ML
INJECTION, SOLUTION INTRAVENOUS
Status: DISCONTINUED | OUTPATIENT
Start: 2018-11-12 | End: 2018-11-12

## 2018-11-12 RX ORDER — LIDOCAINE HCL/PF 100 MG/5ML
SYRINGE (ML) INTRAVENOUS
Status: DISCONTINUED | OUTPATIENT
Start: 2018-11-12 | End: 2018-11-12

## 2018-11-12 RX ORDER — FENTANYL CITRATE 50 UG/ML
INJECTION, SOLUTION INTRAMUSCULAR; INTRAVENOUS
Status: DISCONTINUED | OUTPATIENT
Start: 2018-11-12 | End: 2018-11-12

## 2018-11-12 RX ORDER — CEFAZOLIN SODIUM 2 G/50ML
2 SOLUTION INTRAVENOUS
Status: COMPLETED | OUTPATIENT
Start: 2018-11-12 | End: 2018-11-12

## 2018-11-12 RX ORDER — ONDANSETRON 8 MG/1
8 TABLET, ORALLY DISINTEGRATING ORAL EVERY 8 HOURS PRN
Status: DISCONTINUED | OUTPATIENT
Start: 2018-11-12 | End: 2018-11-13 | Stop reason: HOSPADM

## 2018-11-12 RX ORDER — MORPHINE SULFATE 4 MG/ML
2 INJECTION, SOLUTION INTRAMUSCULAR; INTRAVENOUS EVERY 5 MIN PRN
Status: DISCONTINUED | OUTPATIENT
Start: 2018-11-12 | End: 2018-11-12 | Stop reason: HOSPADM

## 2018-11-12 RX ORDER — METOCLOPRAMIDE HYDROCHLORIDE 5 MG/ML
10 INJECTION INTRAMUSCULAR; INTRAVENOUS EVERY 10 MIN PRN
Status: DISCONTINUED | OUTPATIENT
Start: 2018-11-12 | End: 2018-11-12 | Stop reason: HOSPADM

## 2018-11-12 RX ORDER — DIPHENHYDRAMINE HYDROCHLORIDE 50 MG/ML
25 INJECTION INTRAMUSCULAR; INTRAVENOUS EVERY 4 HOURS PRN
Status: DISCONTINUED | OUTPATIENT
Start: 2018-11-12 | End: 2018-11-12 | Stop reason: RX

## 2018-11-12 RX ORDER — ONDANSETRON 2 MG/ML
INJECTION INTRAMUSCULAR; INTRAVENOUS
Status: DISCONTINUED | OUTPATIENT
Start: 2018-11-12 | End: 2018-11-12

## 2018-11-12 RX ORDER — PROPOFOL 10 MG/ML
VIAL (ML) INTRAVENOUS
Status: DISCONTINUED | OUTPATIENT
Start: 2018-11-12 | End: 2018-11-12

## 2018-11-12 RX ADMIN — ONDANSETRON 4 MG: 2 INJECTION, SOLUTION INTRAMUSCULAR; INTRAVENOUS at 12:11

## 2018-11-12 RX ADMIN — TAMSULOSIN HYDROCHLORIDE 0.4 MG: 0.4 CAPSULE ORAL at 02:11

## 2018-11-12 RX ADMIN — SUCCINYLCHOLINE CHLORIDE 140 MG: 20 INJECTION, SOLUTION INTRAMUSCULAR; INTRAVENOUS at 10:11

## 2018-11-12 RX ADMIN — SODIUM CHLORIDE, SODIUM LACTATE, POTASSIUM CHLORIDE, AND CALCIUM CHLORIDE: .6; .31; .03; .02 INJECTION, SOLUTION INTRAVENOUS at 10:11

## 2018-11-12 RX ADMIN — SODIUM CHLORIDE, SODIUM LACTATE, POTASSIUM CHLORIDE, AND CALCIUM CHLORIDE: .6; .31; .03; .02 INJECTION, SOLUTION INTRAVENOUS at 11:11

## 2018-11-12 RX ADMIN — DEXAMETHASONE SODIUM PHOSPHATE 8 MG: 4 INJECTION, SOLUTION INTRA-ARTICULAR; INTRALESIONAL; INTRAMUSCULAR; INTRAVENOUS; SOFT TISSUE at 12:11

## 2018-11-12 RX ADMIN — FENTANYL CITRATE 50 MCG: 50 INJECTION, SOLUTION INTRAMUSCULAR; INTRAVENOUS at 12:11

## 2018-11-12 RX ADMIN — BUPROPION HYDROCHLORIDE 150 MG: 150 TABLET, FILM COATED, EXTENDED RELEASE ORAL at 08:11

## 2018-11-12 RX ADMIN — CHLORHEXIDINE GLUCONATE 10 ML: 1.2 RINSE ORAL at 08:11

## 2018-11-12 RX ADMIN — PHENYLEPHRINE HYDROCHLORIDE 200 MCG: 10 INJECTION INTRAVENOUS at 11:11

## 2018-11-12 RX ADMIN — CEFAZOLIN SODIUM 2 G: 2 SOLUTION INTRAVENOUS at 10:11

## 2018-11-12 RX ADMIN — INDOCYANINE GREEN 2.5 MG: KIT INTRAVENOUS at 10:11

## 2018-11-12 RX ADMIN — LORAZEPAM 0.5 MG: 2 INJECTION INTRAMUSCULAR; INTRAVENOUS at 01:11

## 2018-11-12 RX ADMIN — DOCUSATE SODIUM 100 MG: 100 CAPSULE, LIQUID FILLED ORAL at 08:11

## 2018-11-12 RX ADMIN — PIPERACILLIN SODIUM AND TAZOBACTAM SODIUM 4.5 G: 4; .5 INJECTION, POWDER, LYOPHILIZED, FOR SOLUTION INTRAVENOUS at 06:11

## 2018-11-12 RX ADMIN — CLONIDINE HYDROCHLORIDE 0.1 MG: 0.1 TABLET ORAL at 08:11

## 2018-11-12 RX ADMIN — FENTANYL CITRATE 50 MCG: 50 INJECTION, SOLUTION INTRAMUSCULAR; INTRAVENOUS at 11:11

## 2018-11-12 RX ADMIN — RAMELTEON 8 MG: 8 TABLET, FILM COATED ORAL at 08:11

## 2018-11-12 RX ADMIN — ROCURONIUM BROMIDE 5 MG: 10 INJECTION, SOLUTION INTRAVENOUS at 10:11

## 2018-11-12 RX ADMIN — FENTANYL CITRATE 100 MCG: 50 INJECTION, SOLUTION INTRAMUSCULAR; INTRAVENOUS at 10:11

## 2018-11-12 RX ADMIN — PROPOFOL 110 MG: 10 INJECTION, EMULSION INTRAVENOUS at 10:11

## 2018-11-12 RX ADMIN — ROCURONIUM BROMIDE 10 MG: 10 INJECTION, SOLUTION INTRAVENOUS at 12:11

## 2018-11-12 RX ADMIN — HYDROCODONE BITARTRATE AND ACETAMINOPHEN 1 TABLET: 5; 325 TABLET ORAL at 08:11

## 2018-11-12 RX ADMIN — NEOSTIGMINE METHYLSULFATE 5 MG: 1 INJECTION INTRAVENOUS at 12:11

## 2018-11-12 RX ADMIN — ROBINUL 0.8 MG: 0.2 INJECTION INTRAMUSCULAR; INTRAVENOUS at 12:11

## 2018-11-12 RX ADMIN — LIDOCAINE HYDROCHLORIDE 100 MG: 20 INJECTION, SOLUTION INTRAVENOUS at 10:11

## 2018-11-12 RX ADMIN — ROBINUL 0.2 MG: 0.2 INJECTION INTRAMUSCULAR; INTRAVENOUS at 11:11

## 2018-11-12 RX ADMIN — ROCURONIUM BROMIDE 35 MG: 10 INJECTION, SOLUTION INTRAVENOUS at 11:11

## 2018-11-12 RX ADMIN — SIMVASTATIN 40 MG: 20 TABLET, FILM COATED ORAL at 08:11

## 2018-11-12 RX ADMIN — SODIUM CHLORIDE, SODIUM LACTATE, POTASSIUM CHLORIDE, AND CALCIUM CHLORIDE: .6; .31; .03; .02 INJECTION, SOLUTION INTRAVENOUS at 02:11
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
## 2018-11-12 NOTE — INTERVAL H&P NOTE
The patient has been examined and the H&P has been reviewed:    I concur with the findings and no changes have occurred since H&P was written.    Anesthesia/Surgery risks, benefits and alternative options discussed and understood by patient/family.          Active Hospital Problems    Diagnosis  POA    *Calculus of gallbladder with acute cholecystitis without obstruction [K80.00]  Yes    CAD, multiple vessel [I25.10]  Yes    Hypertension [I10]  Yes    Anxiety [F41.9]  Yes      Resolved Hospital Problems   No resolved problems to display.

## 2018-11-12 NOTE — HOSPITAL COURSE
11/12/18 No acute issues overnight. The patient is scheduled to go to the OR today for a cholecystectomy.  11/13/18 No issues overnight. The patient is S/P cholecystectomy yesterday per Dr. Leblanc. The patient tolerated the procedure well. The patient reports that his pain is well controlled. The case was discussed with general Surgery who felt the patient could discharge from their perspective. The patient was seen and examined today and deemed stable for discharge. The patient will follow up with his PCP and with General Surgery.

## 2018-11-12 NOTE — OP NOTE
"Operative Note       SURGERY DATE:  11/12/2018    PRE-OP DIAGNOSIS:  Calculus of gallbladder with acute cholecystitis without obstruction [K80.00]    POST-OP DIAGNOSIS:  Calculus of gallbladder with acute cholecystitis without obstruction [K80.00]   Active Hospital Problems    Diagnosis  POA    *Calculus of gallbladder with acute cholecystitis without obstruction [K80.00]  Yes    CAD, multiple vessel [I25.10]  Yes    Hypertension [I10]  Yes    Anxiety [F41.9]  Yes      Resolved Hospital Problems   No resolved problems to display.       Procedure(s) (LRB):  XI ROBOTIC CHOLECYSTECTOMY (N/A)    Surgeon(s) and Role:     * Wojciech Leblanc MD - Primary    ASSISTANTS: None  ANESTHESIA: General    FINDINGS: Acutely inflamed gallbladder with stones.    ESTIMATED BLOOD LOSS: 100 mL              COMPLICATIONS:  None    SPECIMEN:  gall bladder with stones.    Implants: Drain    INDICATION:  Acute cholecystitis due to cholelithiasis.    DESCRIPTION OF PROCEDURE:    The patient was taken to the operating room and underwent general anesthesia with orotracheal intubation. Once the patient was sleep, a "time-out" was called, the patient's ID, the site of surgery, the names of participants, and the planned surgery were confirmed prior to making the incision. A Verres needle was inserted on the left upper quadrant, and achieved pneumoperitoneum. Once gained access, insufflation was achieved up to 15 mm Hg. Then four 8 mm metal trocars were positioned in a line on the abdomen toward the gall bladder. The patient was then positioned in reverse trendelenburg position, and docking was completed. The gall bladder was retracted cephalad, and the Calot's triangle was exposed. After injection of 2.5 mg of Indocyanine Green ( 1 ml of mixture of 25 mg ICG and 10 ml of sterile water), the cystic duct, the common bile duct and artery were identified, and  The cystic duct was clipped twice distally and single proximally with Hemolock clips. This " was repeated for the cystic artery as well. After the structures divided, and then gall bladder was then mobilized off the fossa.The liver bed bleeding required Evicel over the area for hemostasis. JULIA drain #15 Slovenian Delfino tube was inserted in the infrahepatic and felisa hepatis area. The gall bladder was then removed from the peritoneal cavity after placing in a 5 mm bag.After irrigation, and confirmed hemostasis, the port sites were all injected with 0.25% Marcaine and wound closed in the usual fashion with 4-0 Vicryl. The incisions were dressed with steristrips and band-aids. The patient was later awakened and extubated.           CONDITION: Good    DISPOSITION: PACU - hemodynamically stable.     Wojciech Leblanc

## 2018-11-12 NOTE — ASSESSMENT & PLAN NOTE
-, Dr. Leblanc plans surgery 11/12/18  -NPO  11/12/18 No acute issues overnight. The patient is scheduled to go to the OR today for a cholecystectomy. Continue IV fluids. Analgesia PRN

## 2018-11-12 NOTE — PROGRESS NOTES
[facility] Hospital Medicine  Progress Note    Patient Name: Tico Reeves  MRN: [id number]  Patient Class: IP- Inpatient   Admission Date: 11/11/2018  Length of Stay: 1 days  Attending Physician: Jesus Diaz MD  Primary Care Provider: Gonzales Shelton MD        Subjective:     Principal Problem:Calculus of gallbladder with acute cholecystitis without obstruction    HPI:  Tico Reeves is a 73 y.o. male patient with a PMHx of Parkinson's, CAD, HTN, labile HTN, dementia, who presented to the ER for evaluation of RUQ abdominal pain which onset gradually about a week ago. Pain is not exacerbated with eating. Associated sxs included nausea , fever. Patient denies any vomiting, diarrhea, lightheadedness, headache, SOB, CP, and all other sxs at this time. Pt's wife states the pt fell 4 days ago and says he hit his head. Pt was seen in the ED on 11/8/18 for evaluation of elevated white count and abdominal pain and seen by Dr. Greenwood, , discharged home. In ER, u/s GB showed multiple shadow stones, + delarosa sign.  Pt was dx with constipation and calculus of gallbladder without cholecystitis. Dr. Leblanc has been consulted and plans surgery tomorrow. WBC 14, Liver enzymes mildly elevated, Temp 100.2. Wife doesn't want  to know he has Parkinsonism. She reports patient has labile HTN with drastic drops in B/P and takes his B/P at home every 30 minutes and gives him Midodrine 10 mg up to TID. Epic/Labs are unavailable by computer, due to downtime. He was admitted with acute cholecystitis.         Hospital Course:  11/12/18 No acute issues overnight. The patient is scheduled to go to the OR today for a cholecystectomy.      Interval History: No acute issues overnight. The patient is scheduled to go to the OR today for a cholecystectomy.     Review of Systems   Constitutional: Positive for activity change. Negative for appetite change, chills, diaphoresis, fatigue, fever and unexpected weight change.         Total care by wife.    HENT: Negative for congestion, ear discharge, ear pain, facial swelling, hearing loss, postnasal drip, sore throat, tinnitus, trouble swallowing and voice change.    Eyes: Negative for photophobia, pain, discharge, redness, itching and visual disturbance.   Respiratory: Negative for cough, choking, chest tightness, shortness of breath, wheezing and stridor.    Cardiovascular: Negative for chest pain, palpitations and leg swelling.   Gastrointestinal: Positive for constipation. Negative for abdominal distention, abdominal pain, blood in stool, diarrhea, nausea and vomiting.        Incontinent   Endocrine: Negative for cold intolerance, heat intolerance, polydipsia, polyphagia and polyuria.   Genitourinary: Negative for difficulty urinating, flank pain, frequency, hematuria and urgency.        Incontinent   Musculoskeletal: Negative for arthralgias, back pain, gait problem and myalgias.   Skin: Negative for color change, pallor, rash and wound.   Neurological: Positive for tremors (pill rolling), speech difficulty (slow) and weakness. Negative for dizziness, seizures, syncope, facial asymmetry, light-headedness, numbness and headaches.        Stares, shiny face   Hematological: Negative for adenopathy. Does not bruise/bleed easily.   Psychiatric/Behavioral: Negative for agitation, confusion, hallucinations and suicidal ideas. The patient is not nervous/anxious.    All other systems reviewed and are negative.    Objective:     Vital Signs (Most Recent):  Temp: 98.6 °F (37 °C) (11/12/18 0821)  Pulse: 76 (11/12/18 0821)  Resp: 18 (11/12/18 0821)  BP: (!) 176/84 (11/12/18 0821)  SpO2: 96 % (11/12/18 0821) Vital Signs (24h Range):  Temp:  [97.9 °F (36.6 °C)-100.2 °F (37.9 °C)] 98.6 °F (37 °C)  Pulse:  [70-91] 76  Resp:  [16-20] 18  SpO2:  [93 %-96 %] 96 %  BP: (117-181)/(65-84) 176/84     Weight: 72.2 kg (159 lb 2.8 oz)  Body mass index is 21.59 kg/m².    Intake/Output Summary (Last 24 hours) at  11/12/2018 1159  Last data filed at 11/12/2018 1155  Gross per 24 hour   Intake 3013.75 ml   Output --   Net 3013.75 ml      Physical Exam   Constitutional: He is oriented to person, place, and time. He appears well-developed and well-nourished.   [facility]   HENT:   Head: Normocephalic and atraumatic.   Nose: Nose normal.   Eyes: Conjunctivae and EOM are normal. Pupils are equal, round, and reactive to light.   Neck: Normal range of motion. Neck supple. No JVD present. No tracheal deviation present. No thyromegaly present.   Cardiovascular: Normal rate, regular rhythm, normal heart sounds and intact distal pulses. Exam reveals no gallop and no friction rub.   No murmur heard.  Pulmonary/Chest: Effort normal and breath sounds normal. No stridor. No respiratory distress. He has no wheezes. He has no rales. He exhibits no tenderness.   Abdominal: Soft. Bowel sounds are normal. He exhibits no distension. There is tenderness (Positive joel sign). There is guarding. There is no rebound.   Musculoskeletal: He exhibits no edema, tenderness or deformity.   Weakness   Neurological: He is alert and oriented to person, place, and time. He has normal reflexes. No cranial nerve deficit. Coordination normal.   Skin: Skin is warm and dry. No rash noted. No erythema. No pallor.   Psychiatric: He has a normal mood and affect. His behavior is normal. Judgment and thought content normal.   Nursing note and vitals reviewed.      Significant Labs: All pertinent labs within the past 24 hours have been reviewed.    Significant Imaging:   Imaging Results          US Abdomen Limited (Final result)  Result time 11/11/18 15:41:11    Final result by Carlitos Pimentel MD (11/11/18 15:41:11)                 Impression:      The gallbladder is abnormal.  It is distended, thick-walled and contains multiple shadowing stones with a pattern suggesting changes of cholecystitis with positive Joel sign..      Electronically signed by: Wood  MD Adrianna  Date:    11/11/2018  Time:    15:41             Narrative:    EXAMINATION:  US ABDOMEN LIMITED    CLINICAL HISTORY:  RUQ Pain;    TECHNIQUE:  Limited ultrasound of the right upper quadrant of the abdomen (including pancreas, liver, gallbladder, common bile duct, and right kidney) was performed.    COMPARISON:  November 8, 2018    FINDINGS:  Liver: Normal in size. The liver demonstrates homogenous echotexture. no focal hepatic lesions are seen.    Biliary system: The gallbladder is distended.  It measures 11.9 cm in length.  There is diffuse gallbladder wall thickening measuring up to 0.96 cm in diameter with multiple shadowing stones.  Some pericholecystic edema is present.  Joel sign is positive.  The common duct is not dilated, measuring 0.35 cm. No intrahepatic ductal dilatation.    Pancreas: The visualized portions of pancreas appear normal    Right kidney: Normal in size measuring 9.8 cmwith no hydronephrosis, mass, or calculi.    Vascular: The portions of the aorta, vena cava, and portal vein appear free of acute abnormality.                               X-Ray Ribs 2 View Right (Final result)  Result time 11/11/18 14:09:33    Final result by Carlitos Rodas MD (11/11/18 14:09:33)                 Impression:      No acute findings.      Electronically signed by: Wood Rodas MD  Date:    11/11/2018  Time:    14:09             Narrative:    EXAMINATION:  XR RIBS 2 VIEW RIGHT    CLINICAL HISTORY:  Pleurodynia    TECHNIQUE:  Two views of the right ribs were performed.    COMPARISON:  November 8, 2018    FINDINGS:  The right ribs appear intact with no fracture demonstrated.  The right lung appears clear and well expanded.                                   Assessment/Plan:      * Calculus of gallbladder with acute cholecystitis without obstruction    -Dr. Deana ROA plans surgery 11/12/18  -NPO  11/12/18 No acute issues overnight. The patient is scheduled to go to the OR today for a  cholecystectomy. Continue IV fluids. Analgesia PRN      CAD, multiple vessel    Resume home meds       Hypertension    -Labile HTN with episodes of Hypotension. Wife gives Midodrine TID PRN       Anxiety    -takes xanax 1 tab am and lunch, and 2 tabs HS  -NPO will order Ativan PRN         VTE Risk Mitigation (From admission, onward)        Ordered     Place sequential compression device  Until discontinued      11/11/18 1719     IP VTE HIGH RISK PATIENT  Once      11/11/18 1719     Place sequential compression device  Until discontinued      11/11/18 1650              Perez Milligan NP  Department of Hospital Medicine   [facility]

## 2018-11-12 NOTE — TRANSFER OF CARE
Anesthesia Transfer of Care Note    Patient: Tico Reeves    Procedure(s) Performed: Procedure(s) (LRB):  XI ROBOTIC CHOLECYSTECTOMY (N/A)    Patient location: PACU    Anesthesia Type: general    Transport from OR: Transported from OR on room air with adequate spontaneous ventilation    Post pain: adequate analgesia    Post assessment: no apparent anesthetic complications and tolerated procedure well    Post vital signs: stable    Level of consciousness: awake    Nausea/Vomiting: no nausea/vomiting    Complications: none    Transfer of care protocol was followed      Last vitals:   Visit Vitals  BP (!) 158/71 (BP Location: Left arm, Patient Position: Lying)   Pulse 85   Temp 36.7 °C (98 °F) (Temporal)   Resp 18   Ht 6' (1.829 m)   Wt 72.2 kg (159 lb 2.8 oz)   SpO2 96%   BMI 21.59 kg/m²

## 2018-11-12 NOTE — SUBJECTIVE & OBJECTIVE
Interval History: No acute issues overnight. The patient is scheduled to go to the OR today for a cholecystectomy.     Review of Systems   Constitutional: Positive for activity change. Negative for appetite change, chills, diaphoresis, fatigue, fever and unexpected weight change.        Total care by wife.    HENT: Negative for congestion, ear discharge, ear pain, facial swelling, hearing loss, postnasal drip, sore throat, tinnitus, trouble swallowing and voice change.    Eyes: Negative for photophobia, pain, discharge, redness, itching and visual disturbance.   Respiratory: Negative for cough, choking, chest tightness, shortness of breath, wheezing and stridor.    Cardiovascular: Negative for chest pain, palpitations and leg swelling.   Gastrointestinal: Positive for constipation. Negative for abdominal distention, abdominal pain, blood in stool, diarrhea, nausea and vomiting.        Incontinent   Endocrine: Negative for cold intolerance, heat intolerance, polydipsia, polyphagia and polyuria.   Genitourinary: Negative for difficulty urinating, flank pain, frequency, hematuria and urgency.        Incontinent   Musculoskeletal: Negative for arthralgias, back pain, gait problem and myalgias.   Skin: Negative for color change, pallor, rash and wound.   Neurological: Positive for tremors (pill rolling), speech difficulty (slow) and weakness. Negative for dizziness, seizures, syncope, facial asymmetry, light-headedness, numbness and headaches.        Stares, shiny face   Hematological: Negative for adenopathy. Does not bruise/bleed easily.   Psychiatric/Behavioral: Negative for agitation, confusion, hallucinations and suicidal ideas. The patient is not nervous/anxious.    All other systems reviewed and are negative.    Objective:     Vital Signs (Most Recent):  Temp: 98.6 °F (37 °C) (11/12/18 0821)  Pulse: 76 (11/12/18 0821)  Resp: 18 (11/12/18 0821)  BP: (!) 176/84 (11/12/18 0821)  SpO2: 96 % (11/12/18 0821) Vital Signs  (24h Range):  Temp:  [97.9 °F (36.6 °C)-100.2 °F (37.9 °C)] 98.6 °F (37 °C)  Pulse:  [70-91] 76  Resp:  [16-20] 18  SpO2:  [93 %-96 %] 96 %  BP: (117-181)/(65-84) 176/84     Weight: 72.2 kg (159 lb 2.8 oz)  Body mass index is 21.59 kg/m².    Intake/Output Summary (Last 24 hours) at 11/12/2018 1159  Last data filed at 11/12/2018 1155  Gross per 24 hour   Intake 3013.75 ml   Output --   Net 3013.75 ml      Physical Exam   Constitutional: He is oriented to person, place, and time. He appears well-developed and well-nourished.   [facility] care   HENT:   Head: Normocephalic and atraumatic.   Nose: Nose normal.   Eyes: Conjunctivae and EOM are normal. Pupils are equal, round, and reactive to light.   Neck: Normal range of motion. Neck supple. No JVD present. No tracheal deviation present. No thyromegaly present.   Cardiovascular: Normal rate, regular rhythm, normal heart sounds and intact distal pulses. Exam reveals no gallop and no friction rub.   No murmur heard.  Pulmonary/Chest: Effort normal and breath sounds normal. No stridor. No respiratory distress. He has no wheezes. He has no rales. He exhibits no tenderness.   Abdominal: Soft. Bowel sounds are normal. He exhibits no distension. There is tenderness (Positive delarosa sign). There is guarding. There is no rebound.   Musculoskeletal: He exhibits no edema, tenderness or deformity.   Weakness   Neurological: He is alert and oriented to person, place, and time. He has normal reflexes. No cranial nerve deficit. Coordination normal.   Skin: Skin is warm and dry. No rash noted. No erythema. No pallor.   Psychiatric: He has a normal mood and affect. His behavior is normal. Judgment and thought content normal.   Nursing note and vitals reviewed.      Significant Labs: All pertinent labs within the past 24 hours have been reviewed.    Significant Imaging:   Imaging Results          US Abdomen Limited (Final result)  Result time 11/11/18 15:41:11    Final result by Carlitos LEAHY  MD Loren (11/11/18 15:41:11)                 Impression:      The gallbladder is abnormal.  It is distended, thick-walled and contains multiple shadowing stones with a pattern suggesting changes of cholecystitis with positive Joel sign..      Electronically signed by: Wood Rodas MD  Date:    11/11/2018  Time:    15:41             Narrative:    EXAMINATION:  US ABDOMEN LIMITED    CLINICAL HISTORY:  RUQ Pain;    TECHNIQUE:  Limited ultrasound of the right upper quadrant of the abdomen (including pancreas, liver, gallbladder, common bile duct, and right kidney) was performed.    COMPARISON:  November 8, 2018    FINDINGS:  Liver: Normal in size. The liver demonstrates homogenous echotexture. no focal hepatic lesions are seen.    Biliary system: The gallbladder is distended.  It measures 11.9 cm in length.  There is diffuse gallbladder wall thickening measuring up to 0.96 cm in diameter with multiple shadowing stones.  Some pericholecystic edema is present.  Joel sign is positive.  The common duct is not dilated, measuring 0.35 cm. No intrahepatic ductal dilatation.    Pancreas: The visualized portions of pancreas appear normal    Right kidney: Normal in size measuring 9.8 cmwith no hydronephrosis, mass, or calculi.    Vascular: The portions of the aorta, vena cava, and portal vein appear free of acute abnormality.                               X-Ray Ribs 2 View Right (Final result)  Result time 11/11/18 14:09:33    Final result by Carlitos Rodas MD (11/11/18 14:09:33)                 Impression:      No acute findings.      Electronically signed by: Wood Rodas MD  Date:    11/11/2018  Time:    14:09             Narrative:    EXAMINATION:  XR RIBS 2 VIEW RIGHT    CLINICAL HISTORY:  Pleurodynia    TECHNIQUE:  Two views of the right ribs were performed.    COMPARISON:  November 8, 2018    FINDINGS:  The right ribs appear intact with no fracture demonstrated.  The right lung appears clear and well  expanded.

## 2018-11-12 NOTE — PLAN OF CARE
Problem: Patient Care Overview  Goal: Plan of Care Review  Outcome: Ongoing (interventions implemented as appropriate)  Fall precautions maintained. Pt free from falls/injuries.  Patient denies any complaints of pain at this time.   Antibiotics given as prescribed.  Ambulates and repositions with assistance.  Telemetry monitor running normal sinus rhythm.   Plan of care and medications discussed with patient.  Patient verbalized understanding.  Bed locked and low, call bell within reach.  Chart check done. Will continue to monitor.'

## 2018-11-12 NOTE — ANESTHESIA PREPROCEDURE EVALUATION
11/12/2018  Tico Reeves is a 73 y.o., male.    Pre-op Assessment    I have reviewed the Patient Summary Reports.     I have reviewed the Nursing Notes.   I have reviewed the Medications.     Review of Systems  Anesthesia Hx:  No problems with previous Anesthesia  Denies Family Hx of Anesthesia complications.   Denies Personal Hx of Anesthesia complications.   Cardiovascular:   Hypertension Past MI CAD   Remote hx of cab.  Nuclear stress test in 2015 was negative.     Wife gives him midodrine for low blood pressure at home.    Pulmonary:   Sleep Apnea    Neurological:   CVA    Psych:   Psychiatric History          Physical Exam  General:  Well nourished    Airway/Jaw/Neck:  Airway Findings: Mouth Opening: Normal Mallampati: II      Dental:  DENTAL FINDINGS: Normal   Chest/Lungs:  Chest/Lungs Findings: Normal Respiratory Rate     Heart/Vascular:  Heart Findings: Normal       Mental Status:  Mental Status Findings:  Cooperative  tremors       Anesthesia Plan  Type of Anesthesia, risks & benefits discussed:  Anesthesia Type:  general  Patient's Preference:   Intra-op Monitoring Plan:   Intra-op Monitoring Plan Comments:   Post Op Pain Control Plan: multimodal analgesia  Post Op Pain Control Plan Comments:   Induction:    Beta Blocker:  Patient is not currently on a Beta-Blocker (No further documentation required).       Informed Consent:    ASA Score: 3     Day of Surgery Review of History & Physical: I have interviewed and examined the patient. I have reviewed the patient's H&P dated:  There are no significant changes.      Anesthesia Plan Notes: PMHx of Parkinson's, CAD, HTN, and dementia

## 2018-11-12 NOTE — PLAN OF CARE
CM met with patient/family at the bedside to assess for discharge needs.  Patient lives at home with his wife.  His wife provides assistance with any needs.  He has a walker at home but does not normally use it.  He does not drive so his wife provides transportation.  His PCP is Dr Shelton and his preferred pharmacy is [facility].  His wife indicated that she may want home health if necessary.  CM will request PT/OT eval to determine needs.  CM provided a transitional care folder, information on advanced directives, information on pharmacy bedside delivery, and discharge planning begins on admission with contact information for NATHAN Hunter    D/C Plan:  Home vs Home [facility]    CM handed off to NATHAN Hunter    [facility] [address] [facility], [place] - [address]  [address] [address] [place] [address]  Phone: [phone] Fax: [phone]    [facility] [address] [facility]  [address] [facility] [address]  Phone: [phone] Fax: [phone]    Gonzales Shelton MD  Payor: [employer] MEDICARE / Plan: [employer] MEDICARE HMO / Product Type: Capitation /       11/12/18 4705   Discharge Assessment   Assessment Type Discharge Planning Assessment   Confirmed/corrected address and phone number on facesheet? Yes   Assessment information obtained from? Patient;Caregiver;Medical Record   Expected Length of Stay (days) (1-2)   Communicated expected length of stay with patient/caregiver yes   Prior to hospitilization cognitive status: Alert/Oriented   Prior to hospitalization functional status: Independent   Current cognitive status: Alert/Oriented   Current Functional Status: Independent;Assistive Equipment   Facility Arrived From: home   Lives With spouse   Able to Return to Prior Arrangements yes   Is patient able to care for self after discharge? Yes  (with assistance from spouse)   Who are your caregiver(s) and their phone number(s)? Matilda Reeves, spouse  [phone]   Patient's perception of discharge disposition home or selfcare   Readmission Within The Last 30 Days no previous admission in last 30 days   Patient currently being followed by outpatient case management? No   Patient currently receives any other outside agency services? No   Equipment Currently Used at Home walker, rolling   Do you have any problems affording any of your prescribed medications? No   Is the patient taking medications as prescribed? yes   Does the patient have transportation home? Yes   Transportation Available family or friend will provide   Dialysis Name and Scheduled days NA   Does the patient receive services at the [facility]? No   Discharge Plan A Home with family   Discharge Plan B Home with family;Home Health   Patient/Family In Agreement With Plan yes

## 2018-11-12 NOTE — H&P
[facility] Hospital Medicine  History & Physical    Patient Name: Tico Reeves  MRN: [id number]  Admission Date: 11/11/2018  Attending Physician: eJsus Diaz MD   Primary Care Provider: Gonzales Shelton MD    Patient information was obtained from patient, spouse/SO, past medical records and ER records.     Subjective:     Principal Problem:Calculus of gallbladder with acute cholecystitis without obstruction    Chief Complaint:   Chief Complaint   Patient presents with    Abdominal Pain     pt reports pain to right side/flank radiating up x 1 week with fever; pt was seen here last week and had work up but denies any improvement         HPI: Tico Reeves is a 73 y.o. male patient with a PMHx of Parkinson's, CAD, HTN, labile HTN, dementia, who presented to the ER for evaluation of RUQ abdominal pain which onset gradually about a week ago. Pain is not exacerbated with eating. Associated sxs included nausea , fever. Patient denies any vomiting, diarrhea, lightheadedness, headache, SOB, CP, and all other sxs at this time. Pt's wife states the pt fell 4 days ago and says he hit his head. Pt was seen in the ED on 11/8/18 for evaluation of elevated white count and abdominal pain and seen by Dr. Greenwood, , discharged home. In ER, u/s GB showed multiple shadow stones, + delarosa sign.  Pt was dx with constipation and calculus of gallbladder without cholecystitis. Dr. Leblanc has been consulted and plans surgery tomorrow. WBC 14, Liver enzymes mildly elevated, Temp 100.2. Wife doesn't want  to know he has Parkinsonism. She reports patient has labile HTN with drastic drops in B/P and takes his B/P at home every 30 minutes and gives him Midodrine 10 mg up to TID. Epic/Labs are unavailable by computer, due to downtime. He was admitted with acute cholecystitis.         Past Medical History:   Diagnosis Date    Abnormal ECG 10/27/2015    Anxiety     CAD, multiple vessel 10/27/2015    Carotid artery  occlusion     Carotid artery stenosis 6/4/2013    Coronary artery disease     Dementia     Vacular & Frontal lobe    History of PTCA 10/27/2015    Hyperlipidemia     Hypertension     Myocardial infarction     Prostate cancer     Sleep apnea     Stroke     unsure if pt has residual weakness    Subdural hematoma, post-traumatic        Past Surgical History:   Procedure Laterality Date    BRAIN SURGERY      CARDIAC SURGERY      CORONARY ARTERY BYPASS GRAFT  2001    CRANIOTOMY      Ext carotid to Int carotid bypass      HEMORRHOID SURGERY      PROSTATE SURGERY  2001    Radical prostatectomy for prostate cancer    URETHROTOMY-DIRECT VISUAL INTERNAL (DVIU) N/A 11/5/2015    Performed by Agusto Arroyo IV, MD at [facility] OR       Review of patient's allergies indicates:   Allergen Reactions    Lipitor [atorvastatin]      Other reaction(s): Muscle pain       No current facility-administered medications on file prior to encounter.      Current Outpatient Medications on File Prior to Encounter   Medication Sig    ALPRAZolam (XANAX) 1 MG tablet TAKE ONE TABLET BY MOUTH IN THE MORNING THEN ON TABLET AT NOON THEN TWO TABLETS AT BEDTIME    aspirin 81 mg Tab Take 81 mg by mouth Daily. 1 Tablet Oral Every day    bisacodyl (DULCOLAX) 5 mg EC tablet Take 5 mg by mouth daily as needed for Constipation.    buPROPion (WELLBUTRIN XL) 150 MG TB24 tablet Take 1 tablet (150 mg total) by mouth once daily.    cetirizine (ZYRTEC) 10 MG tablet Take 10 mg by mouth once daily.    cholecalciferol, vitamin D3, (VITAMIN D3) 2,000 unit Cap Take 1 capsule by mouth once daily.    citalopram (CELEXA) 40 MG tablet TAKE ONE TABLET BY MOUTH ONCE DAILY    docusate sodium (COLACE) 100 MG capsule Take 1 capsule (100 mg total) by mouth 2 (two) times daily.    linaclotide (LINZESS) 145 mcg Cap capsule Take 1 capsule (145 mcg total) by mouth once daily.    melatonin 10 mg Tab Take 1 tablet by mouth every evening. Pt takes 10mg and 5mg  sometimes     midodrine (PROAMATINE) 10 MG tablet TAKE ONE TABLET BY MOUTH TWICE DAILY    nitroGLYCERIN (NITROSTAT) 0.4 MG SL tablet Place 1 tablet (0.4 mg total) under the tongue every 5 (five) minutes as needed for Chest pain. 1 Tablet, Sublingual Sublingual As directed.    omeprazole (PRILOSEC) 20 MG capsule Take 1 capsule (20 mg total) by mouth once daily.    ondansetron (ZOFRAN-ODT) 4 MG TbDL Take 1 tablet (4 mg total) by mouth every 8 (eight) hours as needed (nausea).    polyethylene glycol (GLYCOLAX) 17 gram/dose powder Take 17 g by mouth once daily.    QUEtiapine (SEROQUEL) 25 MG Tab Take 1 tab in the AM and 1 in the PM    rivastigmine tartrate (EXELON) 6 mg capsule TAKE ONE CAPSULE BY MOUTH TWICE DAILY    simvastatin (ZOCOR) 40 MG tablet TAKE ONE TABLET BY MOUTH IN THE EVENING    tamsulosin (FLOMAX) 0.4 mg Cp24 Take 1 capsule (0.4 mg total) by mouth once daily. (Patient taking differently: Take 0.4 mg by mouth as needed. )     Family History     Problem Relation (Age of Onset)    Cancer Father    Heart disease Mother, Brother        Tobacco Use    Smoking status: Former Smoker     Packs/day: 2.00     Years: 20.00     Pack years: 40.00     Types: Cigarettes     Last attempt to quit: 1995     Years since quittin.4    Smokeless tobacco: Never Used   Substance and Sexual Activity    Alcohol use: No     Alcohol/week: 0.0 oz     Comment: Rare beer    Drug use: No    Sexual activity: Yes     Partners: Female     Review of Systems   Constitutional: Positive for activity change. Negative for appetite change, chills, diaphoresis, fatigue, fever and unexpected weight change.        Total care by wife.    HENT: Negative for congestion, ear discharge, ear pain, facial swelling, hearing loss, postnasal drip, sore throat, tinnitus, trouble swallowing and voice change.    Eyes: Negative for photophobia, pain, discharge, redness, itching and visual disturbance.   Respiratory: Negative for cough,  choking, chest tightness, shortness of breath, wheezing and stridor.    Cardiovascular: Negative for chest pain, palpitations and leg swelling.   Gastrointestinal: Positive for constipation. Negative for abdominal distention, abdominal pain, blood in stool, diarrhea, nausea and vomiting.        Incontinent   Endocrine: Negative for cold intolerance, heat intolerance, polydipsia, polyphagia and polyuria.   Genitourinary: Negative for difficulty urinating, flank pain, frequency, hematuria and urgency.        Incontinent   Musculoskeletal: Negative for arthralgias, back pain, gait problem and myalgias.   Skin: Negative for color change, pallor, rash and wound.   Neurological: Positive for tremors (pill rolling), speech difficulty (slow) and weakness. Negative for dizziness, seizures, syncope, facial asymmetry, light-headedness, numbness and headaches.        Stares, shiny face   Hematological: Negative for adenopathy. Does not bruise/bleed easily.   Psychiatric/Behavioral: Negative for agitation, confusion, hallucinations and suicidal ideas. The patient is not nervous/anxious.    All other systems reviewed and are negative.    Objective:     Vital Signs (Most Recent):  Temp: 98.5 °F (36.9 °C) (11/11/18 1656)  Pulse: 88 (11/11/18 1656)  Resp: 16 (11/11/18 1656)  BP: (!) 155/72 (11/11/18 1656)  SpO2: 95 % (11/11/18 1656) Vital Signs (24h Range):  Temp:  [98.5 °F (36.9 °C)-100.2 °F (37.9 °C)] 98.5 °F (36.9 °C)  Pulse:  [83-91] 88  Resp:  [16-20] 16  SpO2:  [93 %-96 %] 95 %  BP: (117-181)/(65-83) 155/72     Weight: 72.2 kg (159 lb 2.8 oz)  Body mass index is 21.59 kg/m².    Physical Exam   Constitutional: He is oriented to person, place, and time. He appears well-developed and well-nourished.   [facility] care   HENT:   Head: Normocephalic and atraumatic.   Nose: Nose normal.   Eyes: Conjunctivae and EOM are normal. Pupils are equal, round, and reactive to light.   Neck: Normal range of motion. Neck supple. No JVD present. No  tracheal deviation present. No thyromegaly present.   Cardiovascular: Normal rate, regular rhythm, normal heart sounds and intact distal pulses. Exam reveals no gallop and no friction rub.   No murmur heard.  Pulmonary/Chest: Effort normal. No stridor. No respiratory distress. He has no wheezes. He has no rales. He exhibits no tenderness.   Abdominal: Soft. Bowel sounds are normal. He exhibits no distension. There is tenderness (Positive joel sign). There is guarding. There is no rebound.   Musculoskeletal: He exhibits no edema, tenderness or deformity.   Weakness   Neurological: He is alert and oriented to person, place, and time. He has normal reflexes. No cranial nerve deficit. Coordination normal.   Skin: Skin is warm and dry. No rash noted. No erythema. No pallor.   Psychiatric: He has a normal mood and affect. His behavior is normal. Judgment and thought content normal.   Nursing note and vitals reviewed.        CRANIAL NERVES     CN III, IV, VI   Pupils are equal, round, and reactive to light.  Extraocular motions are normal.      Significant Labs: CBC: No results for input(s): WBC, HGB, HCT, PLT in the last 48 hours.  CMP: No results for input(s): NA, K, CL, CO2, GLU, BUN, CREATININE, CALCIUM, PROT, ALBUMIN, BILITOT, ALKPHOS, AST, ALT, ANIONGAP, EGFRNONAA in the last 48 hours.    Invalid input(s): ESTGFAFRICA    Significant Imaging: I have reviewed all pertinent imaging results/findings within the past 24 hours.   Imaging Results          US Abdomen Limited (Final result)  Result time 11/11/18 15:41:11    Final result by Carlitos Rodas MD (11/11/18 15:41:11)                 Impression:      The gallbladder is abnormal.  It is distended, thick-walled and contains multiple shadowing stones with a pattern suggesting changes of cholecystitis with positive Joel sign..      Electronically signed by: Wood Rodas MD  Date:    11/11/2018  Time:    15:41             Narrative:    EXAMINATION:  US ABDOMEN  LIMITED    CLINICAL HISTORY:  RUQ Pain;    TECHNIQUE:  Limited ultrasound of the right upper quadrant of the abdomen (including pancreas, liver, gallbladder, common bile duct, and right kidney) was performed.    COMPARISON:  November 8, 2018    FINDINGS:  Liver: Normal in size. The liver demonstrates homogenous echotexture. no focal hepatic lesions are seen.    Biliary system: The gallbladder is distended.  It measures 11.9 cm in length.  There is diffuse gallbladder wall thickening measuring up to 0.96 cm in diameter with multiple shadowing stones.  Some pericholecystic edema is present.  Joel sign is positive.  The common duct is not dilated, measuring 0.35 cm. No intrahepatic ductal dilatation.    Pancreas: The visualized portions of pancreas appear normal    Right kidney: Normal in size measuring 9.8 cmwith no hydronephrosis, mass, or calculi.    Vascular: The portions of the aorta, vena cava, and portal vein appear free of acute abnormality.                               X-Ray Ribs 2 View Right (Final result)  Result time 11/11/18 14:09:33    Final result by Carlitos Rodas MD (11/11/18 14:09:33)                 Impression:      No acute findings.      Electronically signed by: Wood Rodas MD  Date:    11/11/2018  Time:    14:09             Narrative:    EXAMINATION:  XR RIBS 2 VIEW RIGHT    CLINICAL HISTORY:  Pleurodynia    TECHNIQUE:  Two views of the right ribs were performed.    COMPARISON:  November 8, 2018    FINDINGS:  The right ribs appear intact with no fracture demonstrated.  The right lung appears clear and well expanded.                                    Assessment/Plan:     * Calculus of gallbladder with acute cholecystitis without obstruction    -Dr. Deana ROA plans surgery 11/12/18  -NPO       CAD, multiple vessel    Resume home meds       Hypertension    -Labile HTN with episodes of Hypotension. Wife gives Midodrine TID PRN       Anxiety    -takes xanax 1 tab am and lunch, and 2 tabs  HS  -NPO will order Ativan PRN         VTE Risk Mitigation (From admission, onward)        Ordered     Place sequential compression device  Until discontinued      11/11/18 1719     IP VTE HIGH RISK PATIENT  Once      11/11/18 1719     Place sequential compression device  Until discontinued      11/11/18 1650        Bernadette Coats NP  Department of Hospital Medicine   [facility]

## 2018-11-12 NOTE — ANESTHESIA POSTPROCEDURE EVALUATION
Anesthesia Post Evaluation    Patient: Tico Reeves    Procedure(s) Performed: Procedure(s) (LRB):  XI ROBOTIC CHOLECYSTECTOMY (N/A)    Final Anesthesia Type: general  Patient location during evaluation: PACU  Patient participation: Yes- Able to Participate  Level of consciousness: awake and alert  Post-procedure vital signs: reviewed and stable  Pain management: adequate  Airway patency: patent  PONV status at discharge: No PONV  Anesthetic complications: no      Cardiovascular status: blood pressure returned to baseline  Respiratory status: unassisted  Hydration status: euvolemic  Follow-up not needed.        Visit Vitals  BP (!) 150/72 (BP Location: Left arm, Patient Position: Lying)   Pulse 84   Temp 36.7 °C (98 °F) (Temporal)   Resp 17   Ht 6' (1.829 m)   Wt 72.2 kg (159 lb 2.8 oz)   SpO2 96%   BMI 21.59 kg/m²       Pain/Naya Score: Pain Assessment Performed: Yes (11/12/2018  1:58 PM)  Presence of Pain: complains of pain/discomfort (11/12/2018  1:58 PM)  Pain Rating Prior to Med Admin: 5 (11/12/2018  1:32 PM)  Naya Score: 10 (11/12/2018  1:30 PM)

## 2018-11-12 NOTE — PLAN OF CARE
Problem: Patient Care Overview  Goal: Plan of Care Review  Outcome: Ongoing (interventions implemented as appropriate)  Remains free from injury. Denies pain. Denies anxiety, no signs of distress. Wife at bedside. Fluids and abx administered as ordered. NPO status maintained. Vital signs stable. Chart reviewed. Will continue to monitor.

## 2018-11-13 VITALS
DIASTOLIC BLOOD PRESSURE: 72 MMHG | SYSTOLIC BLOOD PRESSURE: 163 MMHG | RESPIRATION RATE: 16 BRPM | OXYGEN SATURATION: 95 % | HEIGHT: 72 IN | BODY MASS INDEX: 21.56 KG/M2 | WEIGHT: 159.19 LBS | HEART RATE: 78 BPM | TEMPERATURE: 98 F

## 2018-11-13 PROBLEM — K80.00 CALCULUS OF GALLBLADDER WITH ACUTE CHOLECYSTITIS WITHOUT OBSTRUCTION: Status: RESOLVED | Noted: 2018-11-11 | Resolved: 2018-11-13

## 2018-11-13 LAB
ANION GAP SERPL CALC-SCNC: 10 MMOL/L
BUN SERPL-MCNC: 19 MG/DL
CALCIUM SERPL-MCNC: 8.7 MG/DL
CHLORIDE SERPL-SCNC: 103 MMOL/L
CO2 SERPL-SCNC: 23 MMOL/L
CREAT SERPL-MCNC: 0.8 MG/DL
ERYTHROCYTE [DISTWIDTH] IN BLOOD BY AUTOMATED COUNT: 12.2 %
EST. GFR  (AFRICAN AMERICAN): >60 ML/MIN/1.73 M^2
EST. GFR  (NON AFRICAN AMERICAN): >60 ML/MIN/1.73 M^2
GLUCOSE SERPL-MCNC: 102 MG/DL
HCT VFR BLD AUTO: 34.4 %
HGB BLD-MCNC: 11.9 G/DL
MCH RBC QN AUTO: 33 PG
MCHC RBC AUTO-ENTMCNC: 34.6 G/DL
MCV RBC AUTO: 95 FL
PLATELET # BLD AUTO: 237 K/UL
PMV BLD AUTO: 9.7 FL
POTASSIUM SERPL-SCNC: 4.2 MMOL/L
RBC # BLD AUTO: 3.61 M/UL
SODIUM SERPL-SCNC: 136 MMOL/L
WBC # BLD AUTO: 11.26 K/UL

## 2018-11-13 PROCEDURE — 80048 BASIC METABOLIC PNL TOTAL CA: CPT | Mod: HCNC

## 2018-11-13 PROCEDURE — 94799 UNLISTED PULMONARY SVC/PX: CPT | Mod: HCNC

## 2018-11-13 PROCEDURE — 25000003 PHARM REV CODE 250: Mod: HCNC | Performed by: NURSE PRACTITIONER

## 2018-11-13 PROCEDURE — 36415 COLL VENOUS BLD VENIPUNCTURE: CPT | Mod: HCNC

## 2018-11-13 PROCEDURE — 85027 COMPLETE CBC AUTOMATED: CPT | Mod: HCNC

## 2018-11-13 PROCEDURE — 25000003 PHARM REV CODE 250: Mod: HCNC | Performed by: SURGERY

## 2018-11-13 PROCEDURE — 94760 N-INVAS EAR/PLS OXIMETRY 1: CPT | Mod: HCNC

## 2018-11-13 RX ORDER — HYDROCODONE BITARTRATE AND ACETAMINOPHEN 5; 325 MG/1; MG/1
1 TABLET ORAL EVERY 4 HOURS PRN
Qty: 15 TABLET | Refills: 0 | Status: SHIPPED | OUTPATIENT
Start: 2018-11-13 | End: 2018-11-19 | Stop reason: ALTCHOICE

## 2018-11-13 RX ORDER — CIPROFLOXACIN 500 MG/1
500 TABLET ORAL 2 TIMES DAILY
Qty: 10 TABLET | Refills: 0 | Status: SHIPPED | OUTPATIENT
Start: 2018-11-13 | End: 2018-11-13 | Stop reason: HOSPADM

## 2018-11-13 RX ORDER — METRONIDAZOLE 500 MG/1
500 TABLET ORAL 3 TIMES DAILY
Qty: 15 TABLET | Refills: 0 | Status: SHIPPED | OUTPATIENT
Start: 2018-11-13 | End: 2018-11-13 | Stop reason: HOSPADM

## 2018-11-13 NOTE — SUBJECTIVE & OBJECTIVE
Interval History: s/p robotic cholecystectomy. Pain controlled. No new issues.     Medications:  Continuous Infusions:   lactated ringers 125 mL/hr at 11/12/18 1442     Scheduled Meds:   buPROPion  150 mg Oral QHS    chlorhexidine  10 mL Mouth/Throat BID    docusate sodium  100 mg Oral BID    lubiprostone  24 mcg Oral Daily with breakfast    midodrine  10 mg Oral BID    nozaseptin   Each Nare BID    QUEtiapine  25 mg Oral Daily    senna-docusate 8.6-50 mg  1 tablet Oral BID    simvastatin  40 mg Oral QHS    tamsulosin  0.4 mg Oral Daily     PRN Meds:acetaminophen, bisacodyl, cloNIDine, HYDROcodone-acetaminophen, HYDROmorphone, influenza, lactulose, ondansetron, promethazine (PHENERGAN) IVPB, ramelteon     Review of patient's allergies indicates:   Allergen Reactions    Lipitor [atorvastatin]      Other reaction(s): Muscle pain     Objective:     Vital Signs (Most Recent):  Temp: 97.7 °F (36.5 °C) (11/13/18 0741)  Pulse: 78 (11/13/18 0741)  Resp: 16 (11/13/18 0741)  BP: (!) 163/72 (11/13/18 0741)  SpO2: 95 % (11/13/18 0741) Vital Signs (24h Range):  Temp:  [97.7 °F (36.5 °C)-99.3 °F (37.4 °C)] 97.7 °F (36.5 °C)  Pulse:  [70-94] 78  Resp:  [16-20] 16  SpO2:  [92 %-100 %] 95 %  BP: (142-182)/(63-85) 163/72     Weight: 72.2 kg (159 lb 2.8 oz)  Body mass index is 21.59 kg/m².    Intake/Output - Last 3 Shifts       11/11 0700 - 11/12 0659 11/12 0700 - 11/13 0659 11/13 0700 - 11/14 0659    P.O. 0 0     I.V. (mL/kg) 813.8 (11.3) 3195.8 (44.3)     IV Piggyback 1200      Total Intake(mL/kg) 2013.8 (27.9) 3195.8 (44.3)     Urine (mL/kg/hr)  500 (0.3)     Drains  145     Blood  10     Total Output  655     Net +2013.8 +2540.8            Urine Occurrence 3 x 203 x           Physical Exam   Constitutional: He appears well-developed and well-nourished.   HENT:   Head: Normocephalic and atraumatic.   Eyes: EOM are normal.   Cardiovascular: Normal rate and regular rhythm.   Pulmonary/Chest: Effort normal. No respiratory  distress.   Abdominal: Soft. There is tenderness (incisional).   serosanguinous drainage from drain   Musculoskeletal: Normal range of motion.   Neurological: He is alert.   Skin: Skin is warm and dry.   Psychiatric: He has a normal mood and affect. Thought content normal.   Vitals reviewed.      Significant Labs:  CBC:   Recent Labs   Lab 11/13/18  0433   WBC 11.26   RBC 3.61*   HGB 11.9*   HCT 34.4*      MCV 95   MCH 33.0*   MCHC 34.6     CMP:   Recent Labs   Lab 11/12/18  0513 11/13/18  0433   GLU 88 102   CALCIUM 8.9 8.7   ALBUMIN 3.0*  --    PROT 7.0  --     136   K 4.0 4.2   CO2 23 23    103   BUN 12 19   CREATININE 0.8 0.8   ALKPHOS 174*  --    *  --    *  --    BILITOT 3.1*  --        Significant Diagnostics:  I have reviewed all pertinent imaging results/findings within the past 24 hours.

## 2018-11-13 NOTE — PLAN OF CARE
Problem: Patient Care Overview  Goal: Plan of Care Review  Outcome: Ongoing (interventions implemented as appropriate)  Remains free from injury. States relief of pain with available prn meds. Fluids running as ordered. Remains agitated, but consolable at times. Incisions CDI, normoactive bowel sounds. Denies flatus. Vital signs stable. Chart reviewed. Will continue to monitor.

## 2018-11-13 NOTE — DISCHARGE SUMMARY
[facility] Hospital Medicine  Discharge Summary      Patient Name: Tico Reeves  MRN: [id number]  Admission Date: 11/11/2018  Hospital Length of Stay: 2 days  Discharge Date and Time:  11/13/2018 11:24 AM  Attending Physician: No att. providers found   Discharging Provider: Perez Milligan NP  Primary Care Provider: Gonzales Shelton MD      HPI:   Tico Reeves is a 73 y.o. male patient with a PMHx of Parkinson's, CAD, HTN, labile HTN, dementia, who presented to the ER for evaluation of RUQ abdominal pain which onset gradually about a week ago. Pain is not exacerbated with eating. Associated sxs included nausea , fever. Patient denies any vomiting, diarrhea, lightheadedness, headache, SOB, CP, and all other sxs at this time. Pt's wife states the pt fell 4 days ago and says he hit his head. Pt was seen in the ED on 11/8/18 for evaluation of elevated white count and abdominal pain and seen by Dr. Greenwood , discharged home. In ER, u/s GB showed multiple shadow stones, + delaroas sign.  Pt was dx with constipation and calculus of gallbladder without cholecystitis. Dr. Leblanc has been consulted and plans surgery tomorrow. WBC 14, Liver enzymes mildly elevated, Temp 100.2. Wife doesn't want  to know he has Parkinsonism. She reports patient has labile HTN with drastic drops in B/P and takes his B/P at home every 30 minutes and gives him Midodrine 10 mg up to TID. Epic/Labs are unavailable by computer, due to downtime. He was admitted with acute cholecystitis.         Procedure(s) (LRB):  XI ROBOTIC CHOLECYSTECTOMY (N/A)      Hospital Course:   11/12/18 No acute issues overnight. The patient is scheduled to go to the OR today for a cholecystectomy.  11/13/18 No issues overnight. The patient is S/P cholecystectomy yesterday per Dr. Leblanc. The patient tolerated the procedure well. The patient reports that his pain is well controlled. The case was discussed with general Surgery who felt the patient could  discharge from their perspective. The patient was seen and examined today and deemed stable for discharge. The patient will follow up with his PCP and with General Surgery.      Consults:     No new Assessment & Plan notes have been filed under this hospital service since the last note was generated.  Service: Hospital Medicine    Final Active Diagnoses:    Diagnosis Date Noted POA    PRINCIPAL PROBLEM:  Calculus of gallbladder with acute cholecystitis without obstruction [K80.00] 11/11/2018 Unknown    CAD, multiple vessel [I25.10] 10/27/2015 Yes    Hypertension [I10]  Yes    Anxiety [F41.9]  Yes      Problems Resolved During this Admission:       Discharged Condition: stable    Disposition: Home or Self Care    Follow Up:  Follow-up Information     Gonzales Shelton MD. Schedule an appointment as soon as possible for a visit in 3 days.    Specialty:  Internal Medicine  Contact information:  [id number]  [address] [place] [id number] [phone]             Elsa Bryant PA-C. Schedule an appointment as soon as possible for a visit on 11/20/2018.    Specialty:  General Surgery  Why:  post op, drain  Contact information:  [id number] [place] [id number]  [phone]                 Patient Instructions:   No discharge procedures on file.    Significant Diagnostic Studies:   Imaging Results          US Abdomen Limited (Final result)  Result time 11/11/18 15:41:11    Final result by Carlitos Rodas MD (11/11/18 15:41:11)                 Impression:      The gallbladder is abnormal.  It is distended, thick-walled and contains multiple shadowing stones with a pattern suggesting changes of cholecystitis with positive Joel sign..      Electronically signed by: Wood Rodas MD  Date:    11/11/2018  Time:    15:41             Narrative:    EXAMINATION:  US ABDOMEN LIMITED    CLINICAL HISTORY:  RUQ Pain;    TECHNIQUE:  Limited ultrasound of the right upper quadrant of the abdomen  (including pancreas, liver, gallbladder, common bile duct, and right kidney) was performed.    COMPARISON:  November 8, 2018    FINDINGS:  Liver: Normal in size. The liver demonstrates homogenous echotexture. no focal hepatic lesions are seen.    Biliary system: The gallbladder is distended.  It measures 11.9 cm in length.  There is diffuse gallbladder wall thickening measuring up to 0.96 cm in diameter with multiple shadowing stones.  Some pericholecystic edema is present.  Joel sign is positive.  The common duct is not dilated, measuring 0.35 cm. No intrahepatic ductal dilatation.    Pancreas: The visualized portions of pancreas appear normal    Right kidney: Normal in size measuring 9.8 cmwith no hydronephrosis, mass, or calculi.    Vascular: The portions of the aorta, vena cava, and portal vein appear free of acute abnormality.                               X-Ray Ribs 2 View Right (Final result)  Result time 11/11/18 14:09:33    Final result by Carlitos Rodas MD (11/11/18 14:09:33)                 Impression:      No acute findings.      Electronically signed by: Wood Rodas MD  Date:    11/11/2018  Time:    14:09             Narrative:    EXAMINATION:  XR RIBS 2 VIEW RIGHT    CLINICAL HISTORY:  Pleurodynia    TECHNIQUE:  Two views of the right ribs were performed.    COMPARISON:  November 8, 2018    FINDINGS:  The right ribs appear intact with no fracture demonstrated.  The right lung appears clear and well expanded.                                   Pending Diagnostic Studies:     None         Medications:  Reconciled Home Medications:      Medication List      START taking these medications    HYDROcodone-acetaminophen 5-325 mg per tablet  Commonly known as:  NORCO  Take 1 tablet by mouth every 4 (four) hours as needed.     nozaseptin nasal   Commonly known as:  NOZIN  1 each by Each Nare route 2 (two) times daily.        CHANGE how you take these medications    tamsulosin 0.4 mg  Cap  Commonly known as:  FLOMAX  Take 1 capsule (0.4 mg total) by mouth once daily.  What changed:    · when to take this  · reasons to take this        CONTINUE taking these medications    ALPRAZolam 1 MG tablet  Commonly known as:  XANAX  TAKE ONE TABLET BY MOUTH IN THE MORNING THEN ON TABLET AT NOON THEN TWO TABLETS AT BEDTIME     aspirin 81 mg Tab  Take 81 mg by mouth Daily. 1 Tablet Oral Every day     bisacodyl 5 mg EC tablet  Commonly known as:  DULCOLAX  Take 5 mg by mouth daily as needed for Constipation.     buPROPion 150 MG TB24 tablet  Commonly known as:  WELLBUTRIN XL  Take 1 tablet (150 mg total) by mouth once daily.     cetirizine 10 MG tablet  Commonly known as:  ZYRTEC  Take 10 mg by mouth once daily.     citalopram 40 MG tablet  Commonly known as:  CELEXA  TAKE ONE TABLET BY MOUTH ONCE DAILY     docusate sodium 100 MG capsule  Commonly known as:  COLACE  Take 1 capsule (100 mg total) by mouth 2 (two) times daily.     linaclotide 145 mcg Cap capsule  Commonly known as:  LINZESS  Take 1 capsule (145 mcg total) by mouth once daily.     melatonin 10 mg Tab  Take 1 tablet by mouth every evening. Pt takes 10mg and 5mg sometimes     midodrine 10 MG tablet  Commonly known as:  PROAMATINE  TAKE ONE TABLET BY MOUTH TWICE DAILY     nitroGLYCERIN 0.4 MG SL tablet  Commonly known as:  NITROSTAT  Place 1 tablet (0.4 mg total) under the tongue every 5 (five) minutes as needed for Chest pain. 1 Tablet, Sublingual Sublingual As directed.     omeprazole 20 MG capsule  Commonly known as:  PRILOSEC  Take 1 capsule (20 mg total) by mouth once daily.     ondansetron 4 MG Tbdl  Commonly known as:  ZOFRAN-ODT  Take 1 tablet (4 mg total) by mouth every 8 (eight) hours as needed (nausea).     polyethylene glycol 17 gram/dose powder  Commonly known as:  GLYCOLAX  Take 17 g by mouth once daily.     QUEtiapine 25 MG Tab  Commonly known as:  SEROQUEL  Take 1 tab in the AM and 1 in the PM     rivastigmine tartrate 6 mg  capsule  Commonly known as:  EXELON  TAKE ONE CAPSULE BY MOUTH TWICE DAILY     simvastatin 40 MG tablet  Commonly known as:  ZOCOR  TAKE ONE TABLET BY MOUTH IN THE EVENING     VITAMIN D3 2,000 unit Cap  Generic drug:  cholecalciferol (vitamin D3)  Take 1 capsule by mouth once daily.            Indwelling Lines/Drains at time of discharge:   Lines/Drains/Airways     Drain                 Closed/Suction Drain 11/12/18 Right Abdomen Bulb 15 Fr. 1 day                Time spent on the discharge of patient: > 30 minutes  Patient was seen and examined on the date of discharge and determined to be suitable for discharge.         Perez Milligan NP  Department of Hospital Medicine  [facility]

## 2018-11-13 NOTE — NURSING
Discharge instructions reviewed with patient and spouse.   Printed and signed rx given to patient.  Follow up appointments scheduled and discussed.  Incision and drain care educated. Patient's spouse able to perform incision and drain care safely and correctly.   Questions answered. No further needs.

## 2018-11-13 NOTE — PROGRESS NOTES
[facility] -   General Surgery  Progress Note    Subjective:     History of Present Illness:  Tico is 73-year-old white male patient was being seen at the request of emergency room physician for the evaluation of known right upper quadrant abdominal pain associated with fever.  The patient was seen in the emergency department  3 days ago for the evaluation of abdominal pain. He had CT scan of the abdomen which showed no definitive findings suggestive of acute cholecystitis and was discharged home.  He returned to the emergency department today after recurrence of severe right upper quadrant pain associated with nausea.  He has Parkinson's disease with dementia as well as known coronary arterial disease as his past medical history.  He is verbal and able to carry on a conversation.  His wife is at his bedside and explaining recent events.  He denies any abdominal surgery.    Post-Op Info:  Procedure(s) (LRB):  XI ROBOTIC CHOLECYSTECTOMY (N/A)   1 Day Post-Op     Interval History: s/p robotic cholecystectomy. Pain controlled. No new issues.     Medications:  Continuous Infusions:   lactated ringers 125 mL/hr at 11/12/18 1442     Scheduled Meds:   buPROPion  150 mg Oral QHS    chlorhexidine  10 mL Mouth/Throat BID    docusate sodium  100 mg Oral BID    lubiprostone  24 mcg Oral Daily with breakfast    midodrine  10 mg Oral BID    nozaseptin   Each Nare BID    QUEtiapine  25 mg Oral Daily    senna-docusate 8.6-50 mg  1 tablet Oral BID    simvastatin  40 mg Oral QHS    tamsulosin  0.4 mg Oral Daily     PRN Meds:acetaminophen, bisacodyl, cloNIDine, HYDROcodone-acetaminophen, HYDROmorphone, influenza, lactulose, ondansetron, promethazine (PHENERGAN) IVPB, ramelteon     Review of patient's allergies indicates:   Allergen Reactions    Lipitor [atorvastatin]      Other reaction(s): Muscle pain     Objective:     Vital Signs (Most Recent):  Temp: 97.7 °F (36.5 °C) (11/13/18 0741)  Pulse: 78  (11/13/18 0741)  Resp: 16 (11/13/18 0741)  BP: (!) 163/72 (11/13/18 0741)  SpO2: 95 % (11/13/18 0741) Vital Signs (24h Range):  Temp:  [97.7 °F (36.5 °C)-99.3 °F (37.4 °C)] 97.7 °F (36.5 °C)  Pulse:  [70-94] 78  Resp:  [16-20] 16  SpO2:  [92 %-100 %] 95 %  BP: (142-182)/(63-85) 163/72     Weight: 72.2 kg (159 lb 2.8 oz)  Body mass index is 21.59 kg/m².    Intake/Output - Last 3 Shifts       11/11 0700 - 11/12 0659 11/12 0700 - 11/13 0659 11/13 0700 - 11/14 0659    P.O. 0 0     I.V. (mL/kg) 813.8 (11.3) 3195.8 (44.3)     IV Piggyback 1200      Total Intake(mL/kg) 2013.8 (27.9) 3195.8 (44.3)     Urine (mL/kg/hr)  500 (0.3)     Drains  145     Blood  10     Total Output  655     Net +2013.8 +2540.8            Urine Occurrence 3 x 203 x           Physical Exam   Constitutional: He appears well-developed and well-nourished.   HENT:   Head: Normocephalic and atraumatic.   Eyes: EOM are normal.   Cardiovascular: Normal rate and regular rhythm.   Pulmonary/Chest: Effort normal. No respiratory distress.   Abdominal: Soft. There is tenderness (incisional).   serosanguinous drainage from drain   Musculoskeletal: Normal range of motion.   Neurological: He is alert.   Skin: Skin is warm and dry.   Psychiatric: He has a normal mood and affect. Thought content normal.   Vitals reviewed.      Significant Labs:  CBC:   Recent Labs   Lab 11/13/18  0433   WBC 11.26   RBC 3.61*   HGB 11.9*   HCT 34.4*      MCV 95   MCH 33.0*   MCHC 34.6     CMP:   Recent Labs   Lab 11/12/18  0513 11/13/18  0433   GLU 88 102   CALCIUM 8.9 8.7   ALBUMIN 3.0*  --    PROT 7.0  --     136   K 4.0 4.2   CO2 23 23    103   BUN 12 19   CREATININE 0.8 0.8   ALKPHOS 174*  --    *  --    *  --    BILITOT 3.1*  --        Significant Diagnostics:  I have reviewed all pertinent imaging results/findings within the past 24 hours.    Assessment/Plan:     * Calculus of gallbladder with acute cholecystitis without obstruction    S/p  robotic cholecystectomy  Ok to discharge with drain care instructions, follow up 1 week for drain removal  Ok to resume home meds         Elsa Bryant PA-C  General Surgery  [facility]

## 2018-11-13 NOTE — ASSESSMENT & PLAN NOTE
S/p robotic cholecystectomy  Ok to discharge with drain care instructions, follow up 1 week for drain removal  Ok to resume home meds

## 2018-11-14 ENCOUNTER — TELEPHONE (OUTPATIENT)
Dept: SURGERY | Facility: CLINIC | Age: 73
End: 2018-11-14

## 2018-11-14 ENCOUNTER — PATIENT OUTREACH (OUTPATIENT)
Dept: ADMINISTRATIVE | Facility: CLINIC | Age: 73
End: 2018-11-14

## 2018-11-14 NOTE — TELEPHONE ENCOUNTER
Scheduled with DR. Mantilla, tomorrow for 3:00 for a drain removal. They were instructed to remove the drain after two days, no appointment was made and there were no instructions given to the removal of it. Patient has been schedule since Heather Bryant does not have a schedule open to remove the drain. Wife states that there is barely any liquid that is coming out of the drain. She has been massaging/milking the drain has instructed to.    Post op appointment has been made on 11/20/2018 as instructed by discharge. Elsa is not in the office next week for appointment to be made so appointment has been made with Dr. Leblanc.

## 2018-11-14 NOTE — TELEPHONE ENCOUNTER
----- Message from Howie Liz sent at 11/14/2018  1:15 PM CST -----  Matilda ( Pt wife ) is requesting a call from nurse to discuss questions and concerns regarding pt.            Please call Matilda ( Pt wife ) [phone]

## 2018-11-14 NOTE — PATIENT INSTRUCTIONS
Sepsis     To treat sepsis, antibiotics and fluids may by given through an intravenous (IV) line.     Sepsis happens when your body responds with widespread inflammation to a bad infection or bacteremia--the presence of bacteria in your bloodstream. Sepsis can be deadly. Blood pressure may drop and the lungs and kidneys may start to fail. Emergency care for sepsis is crucial.  Risk factors  Those most at risk for sepsis are:  · Infants or older adults  · People who have an illness that weakens their immune system, such as cancer, AIDS, or diabetes  · People being treated with chemotherapy medicines or radiation, which weakens the immune system  · People who have had a transplant  · People with a very severe infection such as pneumonia, meningitis, or a urinary tract infection  When to go to the emergency department (ED)  Sepsis is an emergency. Go to the nearest ED if you have a fever with any of these symptoms:  · Chills and shaking  · Rapid heartbeat and breathing  · Trouble breathing  · Severe nausea or uncontrolled vomiting  · Confusion, disorientation, drowsiness, or dizziness  · Decreased urination  · Severe pain, including in the back or joints   What to expect in the ED  · Blood and urine tests are done to look for bacteria. They also check for organ failure.  · Blood, urine, or sputum cultures may be taken. The samples are sent to a lab. They are placed in a special container. Any bacteria should grow in 24 hours.  · X-rays or other imaging tests may be done.  A person with sepsis will be admitted to the hospital and treated with antibiotics. Treatment may also include oxygen and intravenous fluids.  Date Last Reviewed: 10/1/2016  © 5692-3819 [employer]. [address] [place] [address], [place] [address]. All rights reserved. This information is not intended as a substitute for professional medical care. Always follow your healthcare professional's instructions.

## 2018-11-14 NOTE — PROGRESS NOTES
Wife stated that she was told to remove JULIA drain today. She states that she was not given instructions on how to remove. She called office x 2 today without call back. Wasn't sure if pt's wife misunderstood and told her not able to tell her how to do that. Called Dr. Leblanc's office and  unable to get in touch with nurse. Secure chat sent to DR. Leblanc to call pt. No appt scheduled until 11\27 with MELODY To. Called pt back and in meantime MA from surgeon's office reached out to her. Appt scheduled for tomorrow for drain removal.

## 2018-11-15 ENCOUNTER — OFFICE VISIT (OUTPATIENT)
Dept: SURGERY | Facility: CLINIC | Age: 73
End: 2018-11-15
Payer: MEDICARE

## 2018-11-15 VITALS
WEIGHT: 155.19 LBS | BODY MASS INDEX: 21.05 KG/M2 | DIASTOLIC BLOOD PRESSURE: 76 MMHG | SYSTOLIC BLOOD PRESSURE: 128 MMHG | HEART RATE: 77 BPM | TEMPERATURE: 98 F

## 2018-11-15 DIAGNOSIS — Z90.49 STATUS POST CHOLECYSTECTOMY: Primary | ICD-10-CM

## 2018-11-15 PROCEDURE — 99024 POSTOP FOLLOW-UP VISIT: CPT | Mod: HCNC,S$GLB,, | Performed by: SURGERY

## 2018-11-15 PROCEDURE — 99999 PR PBB SHADOW E&M-EST. PATIENT-LVL III: CPT | Mod: PBBFAC,HCNC,, | Performed by: SURGERY

## 2018-11-15 NOTE — PROGRESS NOTES
General Surgery     Postop Visit Note      Tico Reeves  73 y.o.    Review of patient's allergies indicates:   Allergen Reactions    Lipitor [atorvastatin]      Other reaction(s): Muscle pain       Vitals:    11/15/18 1500   BP: 128/76   Pulse: 77   Temp: 98.4 °F (36.9 °C)       SUBJECTIVE:  73 y.o. male presents s/p robotic cholecystectomy on 11/12/2018.  Patient reports improvement in daily activity.  Tolerating p.o..  No nausea or vomiting.  Normal bowel movements.  Here for drain removal.    OBJECTIVE:  Port incisions healing well, no erythema, no drainage, no tenderness with palpation.   JULIA drain with 15 cc of serosanguineous drainage.  JULIA removed    Pathology- attending    ASSESSMENT:  Status post cholecystectomy    JULIA drain removed    Follow-up for With Dr. Leblanc as scheduled.    Evelin Mantilla

## 2018-11-16 ENCOUNTER — TELEPHONE (OUTPATIENT)
Dept: SURGERY | Facility: CLINIC | Age: 73
End: 2018-11-16

## 2018-11-16 LAB
BACTERIA BLD CULT: NORMAL
BACTERIA BLD CULT: NORMAL

## 2018-11-19 ENCOUNTER — OFFICE VISIT (OUTPATIENT)
Dept: INTERNAL MEDICINE | Facility: CLINIC | Age: 73
End: 2018-11-19
Payer: MEDICARE

## 2018-11-19 VITALS
SYSTOLIC BLOOD PRESSURE: 112 MMHG | BODY MASS INDEX: 21.97 KG/M2 | TEMPERATURE: 99 F | HEART RATE: 64 BPM | WEIGHT: 153.44 LBS | DIASTOLIC BLOOD PRESSURE: 72 MMHG | HEIGHT: 70 IN

## 2018-11-19 DIAGNOSIS — F32.1 CURRENT MODERATE EPISODE OF MAJOR DEPRESSIVE DISORDER WITHOUT PRIOR EPISODE: ICD-10-CM

## 2018-11-19 DIAGNOSIS — Z90.49 STATUS POST CHOLECYSTECTOMY: ICD-10-CM

## 2018-11-19 DIAGNOSIS — I10 ESSENTIAL HYPERTENSION: ICD-10-CM

## 2018-11-19 DIAGNOSIS — I25.10 CAD, MULTIPLE VESSEL: ICD-10-CM

## 2018-11-19 DIAGNOSIS — F41.1 GENERALIZED ANXIETY DISORDER: ICD-10-CM

## 2018-11-19 DIAGNOSIS — I70.0 ATHEROSCLEROSIS OF AORTA: ICD-10-CM

## 2018-11-19 DIAGNOSIS — K80.00 CALCULUS OF GALLBLADDER WITH ACUTE CHOLECYSTITIS WITHOUT OBSTRUCTION: Primary | ICD-10-CM

## 2018-11-19 PROCEDURE — G0008 ADMIN INFLUENZA VIRUS VAC: HCPCS | Mod: HCNC,S$GLB,, | Performed by: INTERNAL MEDICINE

## 2018-11-19 PROCEDURE — 99214 OFFICE O/P EST MOD 30 MIN: CPT | Mod: 25,HCNC,S$GLB, | Performed by: INTERNAL MEDICINE

## 2018-11-19 PROCEDURE — 99999 PR PBB SHADOW E&M-EST. PATIENT-LVL IV: CPT | Mod: PBBFAC,HCNC,, | Performed by: INTERNAL MEDICINE

## 2018-11-19 PROCEDURE — 3074F SYST BP LT 130 MM HG: CPT | Mod: CPTII,HCNC,S$GLB, | Performed by: INTERNAL MEDICINE

## 2018-11-19 PROCEDURE — 3078F DIAST BP <80 MM HG: CPT | Mod: CPTII,HCNC,S$GLB, | Performed by: INTERNAL MEDICINE

## 2018-11-19 PROCEDURE — 90662 IIV NO PRSV INCREASED AG IM: CPT | Mod: HCNC,S$GLB,, | Performed by: INTERNAL MEDICINE

## 2018-11-19 PROCEDURE — 99499 UNLISTED E&M SERVICE: CPT | Mod: S$GLB,,, | Performed by: INTERNAL MEDICINE

## 2018-11-19 PROCEDURE — 1101F PT FALLS ASSESS-DOCD LE1/YR: CPT | Mod: CPTII,HCNC,S$GLB, | Performed by: INTERNAL MEDICINE

## 2018-11-19 RX ORDER — CITALOPRAM 40 MG/1
40 TABLET, FILM COATED ORAL DAILY
Qty: 90 TABLET | Refills: 4 | Status: SHIPPED | OUTPATIENT
Start: 2018-11-19 | End: 2019-11-22 | Stop reason: SDUPTHER

## 2018-11-19 RX ORDER — ALPRAZOLAM 1 MG/1
TABLET ORAL
Qty: 120 TABLET | Refills: 3 | Status: SHIPPED | OUTPATIENT
Start: 2018-11-19 | End: 2019-05-27 | Stop reason: SDUPTHER

## 2019-02-01 ENCOUNTER — TELEPHONE (OUTPATIENT)
Dept: OPHTHALMOLOGY | Facility: CLINIC | Age: 74
End: 2019-02-01

## 2019-02-13 ENCOUNTER — PATIENT MESSAGE (OUTPATIENT)
Dept: INTERNAL MEDICINE | Facility: CLINIC | Age: 74
End: 2019-02-13

## 2019-04-02 ENCOUNTER — PATIENT MESSAGE (OUTPATIENT)
Dept: NEUROLOGY | Facility: CLINIC | Age: 74
End: 2019-04-02

## 2019-04-04 ENCOUNTER — PATIENT MESSAGE (OUTPATIENT)
Dept: NEUROLOGY | Facility: CLINIC | Age: 74
End: 2019-04-04

## 2019-04-04 NOTE — TELEPHONE ENCOUNTER
Last visit 5-19-17  Pt isnt taking Rivastigmie, has a refill request from the pharmacy for  Donepezil ?

## 2019-04-05 ENCOUNTER — PATIENT MESSAGE (OUTPATIENT)
Dept: NEUROLOGY | Facility: CLINIC | Age: 74
End: 2019-04-05

## 2019-04-09 ENCOUNTER — TELEPHONE (OUTPATIENT)
Dept: NEUROLOGY | Facility: CLINIC | Age: 74
End: 2019-04-09

## 2019-04-09 ENCOUNTER — PATIENT MESSAGE (OUTPATIENT)
Dept: NEUROLOGY | Facility: CLINIC | Age: 74
End: 2019-04-09

## 2019-04-09 RX ORDER — DONEPEZIL HYDROCHLORIDE 10 MG/1
10 TABLET, FILM COATED ORAL NIGHTLY
Qty: 90 TABLET | Refills: 3 | Status: SHIPPED | OUTPATIENT
Start: 2019-04-09 | End: 2020-05-29

## 2019-05-27 DIAGNOSIS — F41.1 GENERALIZED ANXIETY DISORDER: ICD-10-CM

## 2019-05-27 RX ORDER — ALPRAZOLAM 1 MG/1
TABLET ORAL
Qty: 120 TABLET | Refills: 3 | Status: SHIPPED | OUTPATIENT
Start: 2019-05-27 | End: 2020-02-12

## 2019-07-02 RX ORDER — MIDODRINE HYDROCHLORIDE 10 MG/1
TABLET ORAL
Qty: 60 TABLET | Refills: 6 | Status: SHIPPED | OUTPATIENT
Start: 2019-07-02 | End: 2020-07-14

## 2019-08-21 ENCOUNTER — PES CALL (OUTPATIENT)
Dept: ADMINISTRATIVE | Facility: CLINIC | Age: 74
End: 2019-08-21

## 2019-09-26 RX ORDER — SIMVASTATIN 40 MG/1
TABLET, FILM COATED ORAL
Qty: 90 TABLET | Refills: 3 | Status: SHIPPED | OUTPATIENT
Start: 2019-09-26 | End: 2019-11-22 | Stop reason: SDUPTHER

## 2019-09-26 RX ORDER — OMEPRAZOLE 20 MG/1
CAPSULE, DELAYED RELEASE ORAL
Qty: 90 CAPSULE | Refills: 4 | Status: SHIPPED | OUTPATIENT
Start: 2019-09-26 | End: 2019-11-22 | Stop reason: SDUPTHER

## 2019-11-21 DIAGNOSIS — G31.84 MILD COGNITIVE IMPAIRMENT WITH MEMORY LOSS: ICD-10-CM

## 2019-11-21 DIAGNOSIS — F32.1 MODERATE SINGLE CURRENT EPISODE OF MAJOR DEPRESSIVE DISORDER: ICD-10-CM

## 2019-11-22 RX ORDER — CITALOPRAM 40 MG/1
40 TABLET, FILM COATED ORAL DAILY
Qty: 90 TABLET | Refills: 0 | Status: SHIPPED | OUTPATIENT
Start: 2019-11-22 | End: 2020-02-16

## 2019-11-22 RX ORDER — SIMVASTATIN 40 MG/1
40 TABLET, FILM COATED ORAL NIGHTLY
Qty: 90 TABLET | Refills: 0 | Status: SHIPPED | OUTPATIENT
Start: 2019-11-22 | End: 2020-02-16

## 2019-11-22 RX ORDER — OMEPRAZOLE 20 MG/1
20 CAPSULE, DELAYED RELEASE ORAL DAILY
Qty: 90 CAPSULE | Refills: 0 | Status: SHIPPED | OUTPATIENT
Start: 2019-11-22 | End: 2020-02-16

## 2019-11-22 RX ORDER — QUETIAPINE FUMARATE 25 MG/1
TABLET, FILM COATED ORAL
Qty: 270 TABLET | Refills: 3 | Status: SHIPPED | OUTPATIENT
Start: 2019-11-22 | End: 2020-05-29 | Stop reason: SDUPTHER

## 2019-11-22 NOTE — TELEPHONE ENCOUNTER
Patient has not seen Dr. Shelton in over a year.  Refill is given but the patient needs an appointment for an updated physical.

## 2020-01-16 ENCOUNTER — PATIENT MESSAGE (OUTPATIENT)
Dept: INTERNAL MEDICINE | Facility: CLINIC | Age: 75
End: 2020-01-16

## 2020-01-16 DIAGNOSIS — I25.10 CAD, MULTIPLE VESSEL: ICD-10-CM

## 2020-01-16 DIAGNOSIS — Z12.5 ENCOUNTER FOR SCREENING FOR MALIGNANT NEOPLASM OF PROSTATE: ICD-10-CM

## 2020-01-16 DIAGNOSIS — Z00.00 ANNUAL PHYSICAL EXAM: Primary | ICD-10-CM

## 2020-01-16 DIAGNOSIS — I10 ESSENTIAL HYPERTENSION: ICD-10-CM

## 2020-01-16 DIAGNOSIS — R73.9 HYPERGLYCEMIA: ICD-10-CM

## 2020-01-17 ENCOUNTER — LAB VISIT (OUTPATIENT)
Dept: LAB | Facility: HOSPITAL | Age: 75
End: 2020-01-17
Attending: INTERNAL MEDICINE
Payer: MEDICARE

## 2020-01-17 ENCOUNTER — PATIENT OUTREACH (OUTPATIENT)
Dept: ADMINISTRATIVE | Facility: HOSPITAL | Age: 75
End: 2020-01-17

## 2020-01-17 DIAGNOSIS — R73.9 HYPERGLYCEMIA: ICD-10-CM

## 2020-01-17 DIAGNOSIS — Z00.00 ANNUAL PHYSICAL EXAM: ICD-10-CM

## 2020-01-17 DIAGNOSIS — I25.10 CAD, MULTIPLE VESSEL: ICD-10-CM

## 2020-01-17 DIAGNOSIS — I10 ESSENTIAL HYPERTENSION: ICD-10-CM

## 2020-01-17 DIAGNOSIS — Z12.5 ENCOUNTER FOR SCREENING FOR MALIGNANT NEOPLASM OF PROSTATE: ICD-10-CM

## 2020-01-17 LAB
BASOPHILS # BLD AUTO: 0.07 K/UL (ref 0–0.2)
BASOPHILS NFR BLD: 0.9 % (ref 0–1.9)
DIFFERENTIAL METHOD: ABNORMAL
EOSINOPHIL # BLD AUTO: 0.2 K/UL (ref 0–0.5)
EOSINOPHIL NFR BLD: 3.1 % (ref 0–8)
ERYTHROCYTE [DISTWIDTH] IN BLOOD BY AUTOMATED COUNT: 12.5 % (ref 11.5–14.5)
HCT VFR BLD AUTO: 45.6 % (ref 40–54)
HGB BLD-MCNC: 14.6 G/DL (ref 14–18)
IMM GRANULOCYTES # BLD AUTO: 0.02 K/UL (ref 0–0.04)
IMM GRANULOCYTES NFR BLD AUTO: 0.3 % (ref 0–0.5)
LYMPHOCYTES # BLD AUTO: 2.2 K/UL (ref 1–4.8)
LYMPHOCYTES NFR BLD: 28.7 % (ref 18–48)
MCH RBC QN AUTO: 33.2 PG (ref 27–31)
MCHC RBC AUTO-ENTMCNC: 32 G/DL (ref 32–36)
MCV RBC AUTO: 104 FL (ref 82–98)
MONOCYTES # BLD AUTO: 0.6 K/UL (ref 0.3–1)
MONOCYTES NFR BLD: 7.9 % (ref 4–15)
NEUTROPHILS # BLD AUTO: 4.6 K/UL (ref 1.8–7.7)
NEUTROPHILS NFR BLD: 59.1 % (ref 38–73)
NRBC BLD-RTO: 0 /100 WBC
PLATELET # BLD AUTO: 258 K/UL (ref 150–350)
PMV BLD AUTO: 9.8 FL (ref 9.2–12.9)
RBC # BLD AUTO: 4.4 M/UL (ref 4.6–6.2)
WBC # BLD AUTO: 7.76 K/UL (ref 3.9–12.7)

## 2020-01-17 PROCEDURE — 80053 COMPREHEN METABOLIC PANEL: CPT | Mod: HCNC

## 2020-01-17 PROCEDURE — 85025 COMPLETE CBC W/AUTO DIFF WBC: CPT | Mod: HCNC

## 2020-01-17 PROCEDURE — 36415 COLL VENOUS BLD VENIPUNCTURE: CPT | Mod: HCNC,PO

## 2020-01-17 PROCEDURE — 83036 HEMOGLOBIN GLYCOSYLATED A1C: CPT | Mod: HCNC

## 2020-01-17 PROCEDURE — 80061 LIPID PANEL: CPT | Mod: HCNC

## 2020-01-17 PROCEDURE — 84153 ASSAY OF PSA TOTAL: CPT | Mod: HCNC

## 2020-01-17 NOTE — PROGRESS NOTES
HM reviewed.   Immunizations abstracted.  Care Everywhere abstracted. No new results found.  Health Maintenance Due   Topic    TETANUS VACCINE     Colonoscopy     Lipid Panel: 1/17/2020 completed   [facility]:no records found  Previsit chart audit completed.  *KDL*

## 2020-01-18 LAB
ALBUMIN SERPL BCP-MCNC: 3.8 G/DL (ref 3.5–5.2)
ALP SERPL-CCNC: 100 U/L (ref 55–135)
ALT SERPL W/O P-5'-P-CCNC: 16 U/L (ref 10–44)
ANION GAP SERPL CALC-SCNC: 10 MMOL/L (ref 8–16)
AST SERPL-CCNC: 19 U/L (ref 10–40)
BILIRUB SERPL-MCNC: 0.4 MG/DL (ref 0.1–1)
BUN SERPL-MCNC: 20 MG/DL (ref 8–23)
CALCIUM SERPL-MCNC: 9.4 MG/DL (ref 8.7–10.5)
CHLORIDE SERPL-SCNC: 102 MMOL/L (ref 95–110)
CHOLEST SERPL-MCNC: 169 MG/DL (ref 120–199)
CHOLEST/HDLC SERPL: 3.6 {RATIO} (ref 2–5)
CO2 SERPL-SCNC: 27 MMOL/L (ref 23–29)
COMPLEXED PSA SERPL-MCNC: 1.5 NG/ML (ref 0–4)
CREAT SERPL-MCNC: 1.1 MG/DL (ref 0.5–1.4)
EST. GFR  (AFRICAN AMERICAN): >60 ML/MIN/1.73 M^2
EST. GFR  (NON AFRICAN AMERICAN): >60 ML/MIN/1.73 M^2
ESTIMATED AVG GLUCOSE: 131 MG/DL (ref 68–131)
GLUCOSE SERPL-MCNC: 116 MG/DL (ref 70–110)
HBA1C MFR BLD HPLC: 6.2 % (ref 4–5.6)
HDLC SERPL-MCNC: 47 MG/DL (ref 40–75)
HDLC SERPL: 27.8 % (ref 20–50)
LDLC SERPL CALC-MCNC: 94 MG/DL (ref 63–159)
NONHDLC SERPL-MCNC: 122 MG/DL
POTASSIUM SERPL-SCNC: 4.5 MMOL/L (ref 3.5–5.1)
PROT SERPL-MCNC: 7.5 G/DL (ref 6–8.4)
SODIUM SERPL-SCNC: 139 MMOL/L (ref 136–145)
TRIGL SERPL-MCNC: 140 MG/DL (ref 30–150)

## 2020-01-20 NOTE — PROGRESS NOTES
Here are the results of your recent labs.  We will review them in detail at your follow up visit.    Sincerely,    Gonzales Shelton M.D.        If you would like to review your experience with Dr. Shelton or [facility], please follow the link below:    [email or web address]

## 2020-01-30 ENCOUNTER — PATIENT MESSAGE (OUTPATIENT)
Dept: INTERNAL MEDICINE | Facility: CLINIC | Age: 75
End: 2020-01-30

## 2020-01-30 ENCOUNTER — OFFICE VISIT (OUTPATIENT)
Dept: INTERNAL MEDICINE | Facility: CLINIC | Age: 75
End: 2020-01-30
Payer: MEDICARE

## 2020-01-30 VITALS
BODY MASS INDEX: 26.41 KG/M2 | SYSTOLIC BLOOD PRESSURE: 110 MMHG | HEART RATE: 68 BPM | TEMPERATURE: 98 F | WEIGHT: 184.5 LBS | HEIGHT: 70 IN | DIASTOLIC BLOOD PRESSURE: 70 MMHG

## 2020-01-30 DIAGNOSIS — Z00.00 ROUTINE GENERAL MEDICAL EXAMINATION AT HEALTH CARE FACILITY: Primary | ICD-10-CM

## 2020-01-30 DIAGNOSIS — I25.10 CAD, MULTIPLE VESSEL: ICD-10-CM

## 2020-01-30 DIAGNOSIS — I70.0 ATHEROSCLEROSIS OF AORTA: ICD-10-CM

## 2020-01-30 DIAGNOSIS — F41.9 ANXIETY: ICD-10-CM

## 2020-01-30 DIAGNOSIS — I10 ESSENTIAL HYPERTENSION: ICD-10-CM

## 2020-01-30 DIAGNOSIS — E78.2 MIXED HYPERLIPIDEMIA: ICD-10-CM

## 2020-01-30 DIAGNOSIS — L89.92 PRESSURE INJURY, STAGE 2, UNSPECIFIED LOCATION: ICD-10-CM

## 2020-01-30 PROCEDURE — G0008 ADMIN INFLUENZA VIRUS VAC: HCPCS | Mod: HCNC,S$GLB,, | Performed by: INTERNAL MEDICINE

## 2020-01-30 PROCEDURE — 99499 RISK ADDL DX/OHS AUDIT: ICD-10-PCS | Mod: S$GLB,,, | Performed by: INTERNAL MEDICINE

## 2020-01-30 PROCEDURE — 99999 PR PBB SHADOW E&M-EST. PATIENT-LVL IV: ICD-10-PCS | Mod: PBBFAC,HCNC,, | Performed by: INTERNAL MEDICINE

## 2020-01-30 PROCEDURE — 99397 PER PM REEVAL EST PAT 65+ YR: CPT | Mod: 25,HCNC,S$GLB, | Performed by: INTERNAL MEDICINE

## 2020-01-30 PROCEDURE — 99499 UNLISTED E&M SERVICE: CPT | Mod: S$GLB,,, | Performed by: INTERNAL MEDICINE

## 2020-01-30 PROCEDURE — G0008 FLU VACCINE - HIGH DOSE (65+) PRESERVATIVE FREE IM: ICD-10-PCS | Mod: HCNC,S$GLB,, | Performed by: INTERNAL MEDICINE

## 2020-01-30 PROCEDURE — 3074F PR MOST RECENT SYSTOLIC BLOOD PRESSURE < 130 MM HG: ICD-10-PCS | Mod: HCNC,CPTII,S$GLB, | Performed by: INTERNAL MEDICINE

## 2020-01-30 PROCEDURE — 90662 FLU VACCINE - HIGH DOSE (65+) PRESERVATIVE FREE IM: ICD-10-PCS | Mod: HCNC,S$GLB,, | Performed by: INTERNAL MEDICINE

## 2020-01-30 PROCEDURE — 99999 PR PBB SHADOW E&M-EST. PATIENT-LVL IV: CPT | Mod: PBBFAC,HCNC,, | Performed by: INTERNAL MEDICINE

## 2020-01-30 PROCEDURE — 99397 PR PREVENTIVE VISIT,EST,65 & OVER: ICD-10-PCS | Mod: 25,HCNC,S$GLB, | Performed by: INTERNAL MEDICINE

## 2020-01-30 PROCEDURE — 3078F PR MOST RECENT DIASTOLIC BLOOD PRESSURE < 80 MM HG: ICD-10-PCS | Mod: HCNC,CPTII,S$GLB, | Performed by: INTERNAL MEDICINE

## 2020-01-30 PROCEDURE — 3074F SYST BP LT 130 MM HG: CPT | Mod: HCNC,CPTII,S$GLB, | Performed by: INTERNAL MEDICINE

## 2020-01-30 PROCEDURE — 90662 IIV NO PRSV INCREASED AG IM: CPT | Mod: HCNC,S$GLB,, | Performed by: INTERNAL MEDICINE

## 2020-01-30 PROCEDURE — 3078F DIAST BP <80 MM HG: CPT | Mod: HCNC,CPTII,S$GLB, | Performed by: INTERNAL MEDICINE

## 2020-01-30 NOTE — PROGRESS NOTES
Subjective:       Patient ID: Tico Reeves is a 75 y.o. male.    Chief Complaint: Annual Exam    HPI Patient is a 75-year-old male presenting today for updated physical exam review of chronic health issues.  Patient has history of hypertension, hyperlipidemia, CAD, severe anxiety and depression, atherosclerosis of the aorta.  He is accompanied by his wife.  He indicates overall he has been doing well.  Since we last saw him he has had no major health issues but he did have a cholecystectomy.  There has been about 25-30 lb of weight gain over the last year and a half.  They do not feel like that he has over E eating but it is definitely situation a very sedentary lifestyle.  He basically gets up in the morning and goes to the bathroom and then goes and sits in chair and watches TV all day until he goes to bed.  His only real activities when he gets up and goes back and forth to the.    His wife indicates he got a couple of areas of skin breakdown.  There were 1 there was a area of skin breakdown and each inguinal canal.  She was able get the left side disorder heal up at the right side has not really healed.  She has also noticed some skin breakdown in the gluteal cleft.    He denies any other issues.  He has not had any problems with any chest pain or palpitations his anxiety seems to be is at his at its baseline level control.    We discussed that he is overdue for colonoscopy and that he had polyps the past.  They did not feel that he can handle doing a colonoscopy at this point.  They are wishing to for go that.  They would get like to get a flu shot today    Review of Systems   Constitutional: Negative for activity change, fever and unexpected weight change.   HENT: Negative for hearing loss, postnasal drip, rhinorrhea and trouble swallowing.    Eyes: Negative for pain, discharge and visual disturbance.   Respiratory: Negative for cough, chest tightness, shortness of breath and wheezing.    Cardiovascular:  "Negative for chest pain and palpitations.   Gastrointestinal: Negative for blood in stool, constipation, diarrhea, nausea and vomiting.   Endocrine: Negative for polydipsia and polyuria.   Genitourinary: Negative for difficulty urinating, dysuria, hematuria and urgency.   Musculoskeletal: Negative for arthralgias, back pain, joint swelling, myalgias, neck pain and neck stiffness.   Skin: Negative for pallor and rash.   Neurological: Positive for weakness. Negative for seizures, syncope and headaches.   Hematological: Negative for adenopathy.   Psychiatric/Behavioral: Positive for confusion. Negative for dysphoric mood. The patient is not nervous/anxious.        Objective:   /70   Pulse 68   Temp 98.1 °F (36.7 °C) (Oral)   Ht 5' 10" (1.778 m)   Wt 83.7 kg (184 lb 8.4 oz)   BMI 26.48 kg/m²      Physical Exam   Constitutional: He is oriented to person, place, and time. He appears well-developed and well-nourished. No distress.   HENT:   Head: Normocephalic and atraumatic.   Mouth/Throat: Oropharynx is clear and moist.   Eyes: Pupils are equal, round, and reactive to light. EOM are normal.   Neck: Normal range of motion. Neck supple. No JVD present. No thyromegaly present.   Cardiovascular: Normal rate, regular rhythm, normal heart sounds and intact distal pulses. Exam reveals no gallop and no friction rub.   No murmur heard.  Pulmonary/Chest: Effort normal and breath sounds normal. He has no wheezes. He has no rales.   Abdominal: Soft. Bowel sounds are normal. He exhibits no distension. There is no tenderness. There is no rebound and no guarding.   Musculoskeletal: Normal range of motion. He exhibits no edema.   Lymphadenopathy:     He has no cervical adenopathy.   Neurological: He is alert and oriented to person, place, and time. He has normal reflexes. No cranial nerve deficit.   Skin: Skin is warm and dry. No rash noted.   Skin breakdown is noted in the right inguinal canal.  No signs at this point of any " sort of fungal infection but there is an ulcerated wound in the right canal.  Left inguinal canal is clear.  Examination of the buttocks reveals erythema consistent with early changes which may lead to ulceration.  There is also some skin breakdown in the gluteal cleft.   Psychiatric: He has a normal mood and affect. Judgment normal.   Vitals reviewed.      Lab Visit on 01/17/2020   Component Date Value    WBC 01/17/2020 7.76     RBC 01/17/2020 4.40*    Hemoglobin 01/17/2020 14.6     Hematocrit 01/17/2020 45.6     Mean Corpuscular Volume 01/17/2020 104*    Mean Corpuscular Hemoglo* 01/17/2020 33.2*    Mean Corpuscular Hemoglo* 01/17/2020 32.0     RDW 01/17/2020 12.5     Platelets 01/17/2020 258     MPV 01/17/2020 9.8     Immature Granulocytes 01/17/2020 0.3     Gran # (ANC) 01/17/2020 4.6     Immature Grans (Abs) 01/17/2020 0.02     Lymph # 01/17/2020 2.2     Mono # 01/17/2020 0.6     Eos # 01/17/2020 0.2     Baso # 01/17/2020 0.07     nRBC 01/17/2020 0     Gran% 01/17/2020 59.1     Lymph% 01/17/2020 28.7     Mono% 01/17/2020 7.9     Eosinophil% 01/17/2020 3.1     Basophil% 01/17/2020 0.9     Differential Method 01/17/2020 Automated     Sodium 01/17/2020 139     Potassium 01/17/2020 4.5     Chloride 01/17/2020 102     CO2 01/17/2020 27     Glucose 01/17/2020 116*    BUN, Bld 01/17/2020 20     Creatinine 01/17/2020 1.1     Calcium 01/17/2020 9.4     Total Protein 01/17/2020 7.5     Albumin 01/17/2020 3.8     Total Bilirubin 01/17/2020 0.4     Alkaline Phosphatase 01/17/2020 100     AST 01/17/2020 19     ALT 01/17/2020 16     Anion Gap 01/17/2020 10     eGFR if African American 01/17/2020 >60.0     eGFR if non African Amer* 01/17/2020 >60.0     PSA, SCREEN 01/17/2020 1.5     Cholesterol 01/17/2020 169     Triglycerides 01/17/2020 140     HDL 01/17/2020 47     LDL Cholesterol 01/17/2020 94.0     Hdl/Cholesterol Ratio 01/17/2020 27.8     Total Cholesterol/HDL Ra*  01/17/2020 3.6     Non-HDL Cholesterol 01/17/2020 122     Hemoglobin A1C 01/17/2020 6.2*    Estimated Avg Glucose 01/17/2020 131        Assessment:       1. Routine general medical examination at health care facility    2. Essential hypertension    3. Mixed hyperlipidemia    4. CAD, multiple vessel    5. Anxiety    6. Atherosclerosis of aorta    7. Pressure injury, stage 2, unspecified location        Plan:   Hypertension  Blood pressure is under good control.  We will continue the current regimen.  Will work on regular aerobic exercise and a low salt diet.        Hyperlipidemia  Cholesterol numbers look good.  We will continue the current regimen at this time.  Remain focused on low fat diet and high dietary fiber intake.      CAD, multiple vessel  Stable over time. No recent issues.      Anxiety  Continue current meds,, stable over time.    Routine general medical examination at health care facility  Comments:  Focus on good health habits, low fat diet, regular exercise, seatbelt use, sunscreen use    Essential hypertension    Mixed hyperlipidemia    CAD, multiple vessel  -     Ambulatory referral to Home Health    Anxiety    Atherosclerosis of aorta  Comments:  COntinue statin and blood pressure control.      Pressure injury, stage 2, unspecified location  -     Ambulatory referral to Home Health    Other orders  -     Influenza - High Dose (65+) (PF) (IM)      We discussed the importance of becoming more active and mobile.  The weight gain is likely related to his inactivity but even more concerning is that he is starting have some skin breakdown related these issues.  We will consult home health that have him come and do wound care and see if he can benefit from some physical therapy as well.  His wife indicates they could do some assistance with ADLs also as he is hard to get in and out of the baths safely.  Will continue his other medications this time.  See him back in follow-up in few weeks    Follow up  in about 3 months (around 4/30/2020).

## 2020-01-30 NOTE — PATIENT INSTRUCTIONS
You are a candidate to receive the new shingles vaccination.  Due to medicare rules we can not give it to you in the clinic.  You will need to get it at the pharmacy.  You can check with your local pharmacy to arrange to get the vaccination.  It is a 2 shot series.  You will get the first shot and then a second shot between 2 and 6 months later.    Increase physical activity.

## 2020-02-01 PROCEDURE — G0180 PR HOME HEALTH MD CERTIFICATION: ICD-10-PCS | Mod: ,,, | Performed by: INTERNAL MEDICINE

## 2020-02-01 PROCEDURE — G0180 MD CERTIFICATION HHA PATIENT: HCPCS | Mod: ,,, | Performed by: INTERNAL MEDICINE

## 2020-02-11 ENCOUNTER — TELEPHONE (OUTPATIENT)
Dept: HOME HEALTH SERVICES | Facility: HOSPITAL | Age: 75
End: 2020-02-11

## 2020-02-11 DIAGNOSIS — F32.1 CURRENT MODERATE EPISODE OF MAJOR DEPRESSIVE DISORDER WITHOUT PRIOR EPISODE: Primary | ICD-10-CM

## 2020-02-11 DIAGNOSIS — F41.1 GENERALIZED ANXIETY DISORDER: ICD-10-CM

## 2020-02-11 DIAGNOSIS — I25.10 CAD, MULTIPLE VESSEL: ICD-10-CM

## 2020-02-12 RX ORDER — ALPRAZOLAM 1 MG/1
TABLET ORAL
Qty: 120 TABLET | Refills: 0 | Status: SHIPPED | OUTPATIENT
Start: 2020-02-12 | End: 2020-06-05

## 2020-02-12 NOTE — TELEPHONE ENCOUNTER
----- Message from Raul Liz sent at 2/12/2020  3:13 PM CST -----  Contact: Anna - [facility] -[phone]  Would like a call from nurse in regards to a prescription for a 3 in 1 potty chair as well as a prescription for compression socks . Please call back to confirm that the fax was received at [phone].           Thank You,   Raul Liz

## 2020-02-16 RX ORDER — OMEPRAZOLE 20 MG/1
20 CAPSULE, DELAYED RELEASE ORAL DAILY
Qty: 90 CAPSULE | Refills: 3 | Status: SHIPPED | OUTPATIENT
Start: 2020-02-16 | End: 2021-03-11

## 2020-02-16 RX ORDER — CITALOPRAM 40 MG/1
40 TABLET, FILM COATED ORAL DAILY
Qty: 90 TABLET | Refills: 3 | Status: SHIPPED | OUTPATIENT
Start: 2020-02-16 | End: 2021-03-11

## 2020-02-16 RX ORDER — SIMVASTATIN 40 MG/1
40 TABLET, FILM COATED ORAL NIGHTLY
Qty: 90 TABLET | Refills: 3 | Status: SHIPPED | OUTPATIENT
Start: 2020-02-16 | End: 2021-03-11

## 2020-02-27 ENCOUNTER — EXTERNAL HOME HEALTH (OUTPATIENT)
Dept: HOME HEALTH SERVICES | Facility: HOSPITAL | Age: 75
End: 2020-02-27
Payer: MEDICARE

## 2020-03-23 ENCOUNTER — PATIENT MESSAGE (OUTPATIENT)
Dept: INTERNAL MEDICINE | Facility: CLINIC | Age: 75
End: 2020-03-23

## 2020-03-23 RX ORDER — NITROGLYCERIN 0.4 MG/1
0.4 TABLET SUBLINGUAL EVERY 5 MIN PRN
Qty: 30 TABLET | Refills: 11 | Status: SHIPPED | OUTPATIENT
Start: 2020-03-23 | End: 2021-04-18

## 2020-05-29 RX ORDER — DONEPEZIL HYDROCHLORIDE 10 MG/1
TABLET, FILM COATED ORAL
Qty: 90 TABLET | Refills: 3 | Status: SHIPPED | OUTPATIENT
Start: 2020-05-29 | End: 2021-03-19 | Stop reason: SDUPTHER

## 2020-09-21 ENCOUNTER — PATIENT MESSAGE (OUTPATIENT)
Dept: INTERNAL MEDICINE | Facility: CLINIC | Age: 75
End: 2020-09-21

## 2020-09-29 ENCOUNTER — PATIENT MESSAGE (OUTPATIENT)
Dept: OTHER | Facility: OTHER | Age: 75
End: 2020-09-29

## 2020-12-01 ENCOUNTER — PATIENT MESSAGE (OUTPATIENT)
Dept: INTERNAL MEDICINE | Facility: CLINIC | Age: 75
End: 2020-12-01

## 2020-12-01 DIAGNOSIS — F41.1 GENERALIZED ANXIETY DISORDER: ICD-10-CM

## 2020-12-01 RX ORDER — ALPRAZOLAM 1 MG/1
TABLET ORAL
Qty: 120 TABLET | Refills: 1 | Status: SHIPPED | OUTPATIENT
Start: 2020-12-01 | End: 2021-07-30

## 2020-12-11 ENCOUNTER — PATIENT MESSAGE (OUTPATIENT)
Dept: OTHER | Facility: OTHER | Age: 75
End: 2020-12-11

## 2021-03-10 ENCOUNTER — PATIENT MESSAGE (OUTPATIENT)
Dept: INTERNAL MEDICINE | Facility: CLINIC | Age: 76
End: 2021-03-10

## 2021-03-10 DIAGNOSIS — G31.84 MILD COGNITIVE IMPAIRMENT WITH MEMORY LOSS: ICD-10-CM

## 2021-03-10 DIAGNOSIS — F32.1 MODERATE SINGLE CURRENT EPISODE OF MAJOR DEPRESSIVE DISORDER: ICD-10-CM

## 2021-03-10 RX ORDER — QUETIAPINE FUMARATE 50 MG/1
50 TABLET, FILM COATED ORAL 2 TIMES DAILY
Qty: 60 TABLET | Refills: 11 | Status: SHIPPED | OUTPATIENT
Start: 2021-03-10 | End: 2021-03-11 | Stop reason: SDUPTHER

## 2021-03-15 DIAGNOSIS — G31.84 MILD COGNITIVE IMPAIRMENT WITH MEMORY LOSS: ICD-10-CM

## 2021-03-15 DIAGNOSIS — F32.1 MODERATE SINGLE CURRENT EPISODE OF MAJOR DEPRESSIVE DISORDER: ICD-10-CM

## 2021-03-15 RX ORDER — QUETIAPINE FUMARATE 50 MG/1
50 TABLET, FILM COATED ORAL 2 TIMES DAILY
Qty: 180 TABLET | Refills: 3 | Status: SHIPPED | OUTPATIENT
Start: 2021-03-15 | End: 2021-05-07

## 2021-03-19 RX ORDER — DONEPEZIL HYDROCHLORIDE 10 MG/1
10 TABLET, FILM COATED ORAL NIGHTLY
Qty: 90 TABLET | Refills: 3 | Status: SHIPPED | OUTPATIENT
Start: 2021-03-19

## 2021-03-25 ENCOUNTER — PATIENT MESSAGE (OUTPATIENT)
Dept: ADMINISTRATIVE | Facility: HOSPITAL | Age: 76
End: 2021-03-25

## 2021-05-07 ENCOUNTER — PATIENT MESSAGE (OUTPATIENT)
Dept: INTERNAL MEDICINE | Facility: CLINIC | Age: 76
End: 2021-05-07

## 2021-05-07 DIAGNOSIS — G31.84 MILD COGNITIVE IMPAIRMENT WITH MEMORY LOSS: ICD-10-CM

## 2021-05-07 DIAGNOSIS — F32.1 MODERATE SINGLE CURRENT EPISODE OF MAJOR DEPRESSIVE DISORDER: ICD-10-CM

## 2021-05-07 RX ORDER — QUETIAPINE FUMARATE 100 MG/1
100 TABLET, FILM COATED ORAL 2 TIMES DAILY
Qty: 180 TABLET | Refills: 3 | Status: SHIPPED | OUTPATIENT
Start: 2021-05-07 | End: 2021-05-10 | Stop reason: SDUPTHER

## 2021-05-07 RX ORDER — QUETIAPINE FUMARATE 100 MG/1
100 TABLET, FILM COATED ORAL 2 TIMES DAILY
Qty: 60 TABLET | Refills: 0 | Status: SHIPPED | OUTPATIENT
Start: 2021-05-07 | End: 2021-05-07 | Stop reason: SDUPTHER

## 2021-05-10 ENCOUNTER — PATIENT MESSAGE (OUTPATIENT)
Dept: INTERNAL MEDICINE | Facility: CLINIC | Age: 76
End: 2021-05-10

## 2021-05-10 DIAGNOSIS — F32.1 MODERATE SINGLE CURRENT EPISODE OF MAJOR DEPRESSIVE DISORDER: ICD-10-CM

## 2021-05-10 DIAGNOSIS — G31.84 MILD COGNITIVE IMPAIRMENT WITH MEMORY LOSS: ICD-10-CM

## 2021-05-10 RX ORDER — QUETIAPINE FUMARATE 100 MG/1
100 TABLET, FILM COATED ORAL 2 TIMES DAILY
Qty: 180 TABLET | Refills: 3 | Status: SHIPPED | OUTPATIENT
Start: 2021-05-10 | End: 2021-05-10 | Stop reason: SDUPTHER

## 2021-05-10 RX ORDER — QUETIAPINE FUMARATE 100 MG/1
100 TABLET, FILM COATED ORAL 2 TIMES DAILY
Qty: 180 TABLET | Refills: 3 | Status: SHIPPED | OUTPATIENT
Start: 2021-05-10

## 2021-05-11 DIAGNOSIS — G31.84 MILD COGNITIVE IMPAIRMENT WITH MEMORY LOSS: ICD-10-CM

## 2021-05-11 DIAGNOSIS — F32.1 MODERATE SINGLE CURRENT EPISODE OF MAJOR DEPRESSIVE DISORDER: ICD-10-CM

## 2021-05-11 RX ORDER — QUETIAPINE FUMARATE 100 MG/1
100 TABLET, FILM COATED ORAL 2 TIMES DAILY
Qty: 30 TABLET | Refills: 0 | Status: CANCELLED | OUTPATIENT
Start: 2021-05-11

## 2021-05-13 ENCOUNTER — OFFICE VISIT (OUTPATIENT)
Dept: INTERNAL MEDICINE | Facility: CLINIC | Age: 76
End: 2021-05-13
Payer: MEDICARE

## 2021-05-13 VITALS
BODY MASS INDEX: 24.81 KG/M2 | TEMPERATURE: 98 F | SYSTOLIC BLOOD PRESSURE: 110 MMHG | HEART RATE: 62 BPM | DIASTOLIC BLOOD PRESSURE: 68 MMHG | WEIGHT: 173.31 LBS | HEIGHT: 70 IN

## 2021-05-13 DIAGNOSIS — Z12.5 ENCOUNTER FOR SCREENING FOR MALIGNANT NEOPLASM OF PROSTATE: ICD-10-CM

## 2021-05-13 DIAGNOSIS — Z12.11 COLON CANCER SCREENING: ICD-10-CM

## 2021-05-13 DIAGNOSIS — Z00.00 ROUTINE GENERAL MEDICAL EXAMINATION AT HEALTH CARE FACILITY: Primary | ICD-10-CM

## 2021-05-13 DIAGNOSIS — I10 ESSENTIAL HYPERTENSION: ICD-10-CM

## 2021-05-13 DIAGNOSIS — I25.10 CAD, MULTIPLE VESSEL: ICD-10-CM

## 2021-05-13 DIAGNOSIS — F41.9 ANXIETY: ICD-10-CM

## 2021-05-13 DIAGNOSIS — R73.9 HYPERGLYCEMIA: ICD-10-CM

## 2021-05-13 DIAGNOSIS — E78.2 MIXED HYPERLIPIDEMIA: ICD-10-CM

## 2021-05-13 DIAGNOSIS — F32.1 CURRENT MODERATE EPISODE OF MAJOR DEPRESSIVE DISORDER WITHOUT PRIOR EPISODE: ICD-10-CM

## 2021-05-13 DIAGNOSIS — I70.0 ATHEROSCLEROSIS OF AORTA: ICD-10-CM

## 2021-05-13 PROBLEM — K80.00 CALCULUS OF GALLBLADDER WITH ACUTE CHOLECYSTITIS WITHOUT OBSTRUCTION: Status: RESOLVED | Noted: 2018-11-11 | Resolved: 2021-05-13

## 2021-05-13 PROBLEM — R10.9 ABDOMINAL PAIN: Status: RESOLVED | Noted: 2018-11-08 | Resolved: 2021-05-13

## 2021-05-13 PROBLEM — Z90.49 STATUS POST CHOLECYSTECTOMY: Status: RESOLVED | Noted: 2018-11-15 | Resolved: 2021-05-13

## 2021-05-13 PROCEDURE — 99499 UNLISTED E&M SERVICE: CPT | Mod: S$GLB,,, | Performed by: INTERNAL MEDICINE

## 2021-05-13 PROCEDURE — 99397 PER PM REEVAL EST PAT 65+ YR: CPT | Mod: S$GLB,,, | Performed by: INTERNAL MEDICINE

## 2021-05-13 PROCEDURE — 3288F PR FALLS RISK ASSESSMENT DOCUMENTED: ICD-10-PCS | Mod: CPTII,S$GLB,, | Performed by: INTERNAL MEDICINE

## 2021-05-13 PROCEDURE — 1126F AMNT PAIN NOTED NONE PRSNT: CPT | Mod: S$GLB,,, | Performed by: INTERNAL MEDICINE

## 2021-05-13 PROCEDURE — 3288F FALL RISK ASSESSMENT DOCD: CPT | Mod: CPTII,S$GLB,, | Performed by: INTERNAL MEDICINE

## 2021-05-13 PROCEDURE — 99397 PR PREVENTIVE VISIT,EST,65 & OVER: ICD-10-PCS | Mod: S$GLB,,, | Performed by: INTERNAL MEDICINE

## 2021-05-13 PROCEDURE — 99999 PR PBB SHADOW E&M-EST. PATIENT-LVL IV: CPT | Mod: PBBFAC,,, | Performed by: INTERNAL MEDICINE

## 2021-05-13 PROCEDURE — 1101F PT FALLS ASSESS-DOCD LE1/YR: CPT | Mod: CPTII,S$GLB,, | Performed by: INTERNAL MEDICINE

## 2021-05-13 PROCEDURE — 1126F PR PAIN SEVERITY QUANTIFIED, NO PAIN PRESENT: ICD-10-PCS | Mod: S$GLB,,, | Performed by: INTERNAL MEDICINE

## 2021-05-13 PROCEDURE — 1101F PR PT FALLS ASSESS DOC 0-1 FALLS W/OUT INJ PAST YR: ICD-10-PCS | Mod: CPTII,S$GLB,, | Performed by: INTERNAL MEDICINE

## 2021-05-13 PROCEDURE — 99499 RISK ADDL DX/OHS AUDIT: ICD-10-PCS | Mod: S$GLB,,, | Performed by: INTERNAL MEDICINE

## 2021-05-13 PROCEDURE — 1157F ADVNC CARE PLAN IN RCRD: CPT | Mod: S$GLB,,, | Performed by: INTERNAL MEDICINE

## 2021-05-13 PROCEDURE — 99999 PR PBB SHADOW E&M-EST. PATIENT-LVL IV: ICD-10-PCS | Mod: PBBFAC,,, | Performed by: INTERNAL MEDICINE

## 2021-05-13 PROCEDURE — 1157F PR ADVANCE CARE PLAN OR EQUIV PRESENT IN MEDICAL RECORD: ICD-10-PCS | Mod: S$GLB,,, | Performed by: INTERNAL MEDICINE

## 2021-05-14 ENCOUNTER — LAB VISIT (OUTPATIENT)
Dept: LAB | Facility: HOSPITAL | Age: 76
End: 2021-05-14
Attending: INTERNAL MEDICINE
Payer: MEDICARE

## 2021-05-14 DIAGNOSIS — Z12.5 ENCOUNTER FOR SCREENING FOR MALIGNANT NEOPLASM OF PROSTATE: ICD-10-CM

## 2021-05-14 DIAGNOSIS — R73.9 HYPERGLYCEMIA: ICD-10-CM

## 2021-05-14 DIAGNOSIS — I25.10 CAD, MULTIPLE VESSEL: ICD-10-CM

## 2021-05-14 LAB
ALBUMIN SERPL BCP-MCNC: 4.1 G/DL (ref 3.5–5.2)
ALP SERPL-CCNC: 99 U/L (ref 55–135)
ALT SERPL W/O P-5'-P-CCNC: 21 U/L (ref 10–44)
ANION GAP SERPL CALC-SCNC: 10 MMOL/L (ref 8–16)
AST SERPL-CCNC: 24 U/L (ref 10–40)
BASOPHILS # BLD AUTO: 0.07 K/UL (ref 0–0.2)
BASOPHILS NFR BLD: 0.8 % (ref 0–1.9)
BILIRUB SERPL-MCNC: 0.6 MG/DL (ref 0.1–1)
BUN SERPL-MCNC: 13 MG/DL (ref 8–23)
CALCIUM SERPL-MCNC: 10.3 MG/DL (ref 8.7–10.5)
CHLORIDE SERPL-SCNC: 101 MMOL/L (ref 95–110)
CHOLEST SERPL-MCNC: 167 MG/DL (ref 120–199)
CHOLEST/HDLC SERPL: 3.7 {RATIO} (ref 2–5)
CO2 SERPL-SCNC: 27 MMOL/L (ref 23–29)
COMPLEXED PSA SERPL-MCNC: 1.9 NG/ML (ref 0–4)
CREAT SERPL-MCNC: 1.4 MG/DL (ref 0.5–1.4)
DIFFERENTIAL METHOD: ABNORMAL
EOSINOPHIL # BLD AUTO: 0.2 K/UL (ref 0–0.5)
EOSINOPHIL NFR BLD: 2.3 % (ref 0–8)
ERYTHROCYTE [DISTWIDTH] IN BLOOD BY AUTOMATED COUNT: 12.3 % (ref 11.5–14.5)
EST. GFR  (AFRICAN AMERICAN): 56.4 ML/MIN/1.73 M^2
EST. GFR  (NON AFRICAN AMERICAN): 48.8 ML/MIN/1.73 M^2
ESTIMATED AVG GLUCOSE: 123 MG/DL (ref 68–131)
GLUCOSE SERPL-MCNC: 98 MG/DL (ref 70–110)
HBA1C MFR BLD: 5.9 % (ref 4–5.6)
HCT VFR BLD AUTO: 46.8 % (ref 40–54)
HDLC SERPL-MCNC: 45 MG/DL (ref 40–75)
HDLC SERPL: 26.9 % (ref 20–50)
HGB BLD-MCNC: 15.1 G/DL (ref 14–18)
IMM GRANULOCYTES # BLD AUTO: 0.02 K/UL (ref 0–0.04)
IMM GRANULOCYTES NFR BLD AUTO: 0.2 % (ref 0–0.5)
LDLC SERPL CALC-MCNC: 89.6 MG/DL (ref 63–159)
LYMPHOCYTES # BLD AUTO: 2.5 K/UL (ref 1–4.8)
LYMPHOCYTES NFR BLD: 26.3 % (ref 18–48)
MCH RBC QN AUTO: 32.6 PG (ref 27–31)
MCHC RBC AUTO-ENTMCNC: 32.3 G/DL (ref 32–36)
MCV RBC AUTO: 101 FL (ref 82–98)
MONOCYTES # BLD AUTO: 0.7 K/UL (ref 0.3–1)
MONOCYTES NFR BLD: 7.9 % (ref 4–15)
NEUTROPHILS # BLD AUTO: 5.8 K/UL (ref 1.8–7.7)
NEUTROPHILS NFR BLD: 62.5 % (ref 38–73)
NONHDLC SERPL-MCNC: 122 MG/DL
NRBC BLD-RTO: 0 /100 WBC
PLATELET # BLD AUTO: 303 K/UL (ref 150–450)
PMV BLD AUTO: 9.7 FL (ref 9.2–12.9)
POTASSIUM SERPL-SCNC: 4.5 MMOL/L (ref 3.5–5.1)
PROT SERPL-MCNC: 8.5 G/DL (ref 6–8.4)
RBC # BLD AUTO: 4.63 M/UL (ref 4.6–6.2)
SODIUM SERPL-SCNC: 138 MMOL/L (ref 136–145)
TRIGL SERPL-MCNC: 162 MG/DL (ref 30–150)
WBC # BLD AUTO: 9.32 K/UL (ref 3.9–12.7)

## 2021-05-14 PROCEDURE — 83036 HEMOGLOBIN GLYCOSYLATED A1C: CPT | Performed by: INTERNAL MEDICINE

## 2021-05-14 PROCEDURE — 36415 COLL VENOUS BLD VENIPUNCTURE: CPT | Mod: PO | Performed by: INTERNAL MEDICINE

## 2021-05-14 PROCEDURE — 85025 COMPLETE CBC W/AUTO DIFF WBC: CPT | Performed by: INTERNAL MEDICINE

## 2021-05-14 PROCEDURE — 84153 ASSAY OF PSA TOTAL: CPT | Performed by: INTERNAL MEDICINE

## 2021-05-14 PROCEDURE — 80053 COMPREHEN METABOLIC PANEL: CPT | Performed by: INTERNAL MEDICINE

## 2021-05-14 PROCEDURE — 80061 LIPID PANEL: CPT | Performed by: INTERNAL MEDICINE

## 2021-06-15 ENCOUNTER — PES CALL (OUTPATIENT)
Dept: ADMINISTRATIVE | Facility: CLINIC | Age: 76
End: 2021-06-15

## 2021-07-02 ENCOUNTER — PATIENT MESSAGE (OUTPATIENT)
Dept: INTERNAL MEDICINE | Facility: CLINIC | Age: 76
End: 2021-07-02

## 2021-07-02 DIAGNOSIS — F32.1 MODERATE SINGLE CURRENT EPISODE OF MAJOR DEPRESSIVE DISORDER: ICD-10-CM

## 2021-07-02 RX ORDER — BUPROPION HYDROCHLORIDE 150 MG/1
150 TABLET ORAL DAILY
Qty: 90 TABLET | Refills: 3 | Status: SHIPPED | OUTPATIENT
Start: 2021-07-02

## 2021-07-02 RX ORDER — CHLORHEXIDINE GLUCONATE ORAL RINSE 1.2 MG/ML
SOLUTION DENTAL
COMMUNITY
Start: 2021-05-11

## 2021-07-28 ENCOUNTER — TELEPHONE (OUTPATIENT)
Dept: ADMINISTRATIVE | Facility: HOSPITAL | Age: 76
End: 2021-07-28

## 2021-08-05 ENCOUNTER — PATIENT MESSAGE (OUTPATIENT)
Dept: INTERNAL MEDICINE | Facility: CLINIC | Age: 76
End: 2021-08-05

## 2021-08-11 ENCOUNTER — TELEPHONE (OUTPATIENT)
Dept: ADMINISTRATIVE | Facility: HOSPITAL | Age: 76
End: 2021-08-11

## 2021-11-03 ENCOUNTER — TELEPHONE (OUTPATIENT)
Dept: INTERNAL MEDICINE | Facility: CLINIC | Age: 76
End: 2021-11-03
Payer: MEDICARE

## 2021-11-03 ENCOUNTER — PATIENT MESSAGE (OUTPATIENT)
Dept: INTERNAL MEDICINE | Facility: CLINIC | Age: 76
End: 2021-11-03
Payer: MEDICARE

## 2021-11-08 ENCOUNTER — PATIENT MESSAGE (OUTPATIENT)
Dept: INTERNAL MEDICINE | Facility: CLINIC | Age: 76
End: 2021-11-08
Payer: MEDICARE

## 2022-04-27 ENCOUNTER — PATIENT MESSAGE (OUTPATIENT)
Dept: ADMINISTRATIVE | Facility: HOSPITAL | Age: 77
End: 2022-04-27
Payer: MEDICARE

## (undated) DEVICE — APPLICATOR ARISTA FLEX XL

## (undated) DEVICE — ADHESIVE MASTISOL VIAL 48/BX

## (undated) DEVICE — GLOVE 7.0 PROTEXIS PI BLUE

## (undated) DEVICE — SEE MEDLINE ITEM 157027

## (undated) DEVICE — DECANTER VIAL ASEPTIC TRANSFER

## (undated) DEVICE — SEE MEDLINE ITEM 152680

## (undated) DEVICE — SEAL UNIVERSAL 5MM-8MM XI

## (undated) DEVICE — COVER OVERHEAD SURG LT BLUE

## (undated) DEVICE — EVACUATOR WOUND BULB 100CC

## (undated) DEVICE — Device

## (undated) DEVICE — SEE MEDLINE ITEM 157117

## (undated) DEVICE — COVER TIP CURVED SCISSORS XI

## (undated) DEVICE — CLIP HEMO-LOK ML

## (undated) DEVICE — SEE MEDLINE ITEM 157131

## (undated) DEVICE — NDL PNEUMO INSUFFLATI 120MM

## (undated) DEVICE — GLOVE PROTEXIS HYDROGEL SZ7

## (undated) DEVICE — DRAPE ABDOMINAL TIBURON 14X11

## (undated) DEVICE — SYR 30CC LUER LOCK

## (undated) DEVICE — IRRIGATOR ENDOWRIST XI SUCTION

## (undated) DEVICE — SOL NS 1000CC

## (undated) DEVICE — MANIFOLD 4 PORT

## (undated) DEVICE — SOL STRL WATER INJ 1000ML BG

## (undated) DEVICE — KIT ANTIFOG

## (undated) DEVICE — OBTURATOR BLADELESS 8MM XI CLR

## (undated) DEVICE — SEE MEDLINE ITEM 152622

## (undated) DEVICE — APPLICATOR CHLORAPREP ORN 26ML

## (undated) DEVICE — BAG TISSUE RETRIEVAL 5MM

## (undated) DEVICE — DRAPE COLUMN DAVINCI XI

## (undated) DEVICE — SUT VICRYL PLUS 4-0 P3 18IN

## (undated) DEVICE — CLOSURE SKIN STERI STRIP 1/2X4

## (undated) DEVICE — NDL INSUF ULTRA VERESS 120MM

## (undated) DEVICE — NDL SAFETY 22G X 1.5 ECLIPSE

## (undated) DEVICE — EVACUATOR KIT SMOKE PLUME AWAY

## (undated) DEVICE — POSITIONER HEAD DONUT 9IN FOAM

## (undated) DEVICE — SYR 3CC LUER LOC

## (undated) DEVICE — ELECTRODE REM PLYHSV RETURN 9

## (undated) DEVICE — TUBING HEATED INSUFFLATOR

## (undated) DEVICE — SUPPORT ULNA NERVE PROTECTOR

## (undated) DEVICE — DRAPE ARM DAVINCI XI

## (undated) DEVICE — CLIPPER BLADE MOD 4406 (CAREF)